# Patient Record
Sex: FEMALE | Race: BLACK OR AFRICAN AMERICAN | NOT HISPANIC OR LATINO | Employment: FULL TIME | URBAN - METROPOLITAN AREA
[De-identification: names, ages, dates, MRNs, and addresses within clinical notes are randomized per-mention and may not be internally consistent; named-entity substitution may affect disease eponyms.]

---

## 2017-08-17 ENCOUNTER — TELEPHONE (OUTPATIENT)
Dept: ORTHOPEDICS | Facility: CLINIC | Age: 52
End: 2017-08-17

## 2017-08-17 NOTE — TELEPHONE ENCOUNTER
----- Message from Marcela Morales sent at 8/17/2017  9:53 AM CDT -----  Contact: self  Needs injections in both knees.  Please call back at

## 2017-08-31 ENCOUNTER — OFFICE VISIT (OUTPATIENT)
Dept: ORTHOPEDICS | Facility: CLINIC | Age: 52
End: 2017-08-31
Payer: OTHER GOVERNMENT

## 2017-08-31 VITALS — BODY MASS INDEX: 31.5 KG/M2 | WEIGHT: 220 LBS | HEIGHT: 70 IN

## 2017-08-31 DIAGNOSIS — M17.11 ARTHRITIS OF RIGHT KNEE: ICD-10-CM

## 2017-08-31 DIAGNOSIS — M17.12 PRIMARY OSTEOARTHRITIS OF LEFT KNEE: Primary | ICD-10-CM

## 2017-08-31 PROCEDURE — 99212 OFFICE O/P EST SF 10 MIN: CPT | Mod: PBBFAC,PN,25 | Performed by: ORTHOPAEDIC SURGERY

## 2017-08-31 PROCEDURE — 99499 UNLISTED E&M SERVICE: CPT | Mod: S$PBB,,, | Performed by: ORTHOPAEDIC SURGERY

## 2017-08-31 PROCEDURE — 20610 DRAIN/INJ JOINT/BURSA W/O US: CPT | Mod: 50,PBBFAC,PN | Performed by: ORTHOPAEDIC SURGERY

## 2017-08-31 PROCEDURE — 99999 PR PBB SHADOW E&M-EST. PATIENT-LVL II: CPT | Mod: PBBFAC,,, | Performed by: ORTHOPAEDIC SURGERY

## 2017-08-31 RX ADMIN — Medication 16 MG: at 10:08

## 2017-08-31 NOTE — PROCEDURES
Large Joint Aspiration/Injection  Date/Time: 8/31/2017 10:24 AM  Performed by: PATO ROD  Authorized by: PATO ROD     Consent Done?:  Yes (Verbal)  Indications:  Pain  Procedure site marked: Yes    Timeout: Prior to procedure the correct patient, procedure, and site was verified      Location:  Knee  Site:  R knee and L knee  Prep: Patient was prepped and draped in usual sterile fashion    Needle size:  20 G  Approach:  Anterolateral  Medications:  16 mg hyaluronate 16 mg/2 mL; 16 mg hyaluronate 16 mg/2 mL  Patient tolerance:  Patient tolerated the procedure well with no immediate complications

## 2017-08-31 NOTE — PROGRESS NOTES
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      aspirin-acetaminophen-caffeine 250-250-65 mg (EXCEDRIN MIGRAINE) 250-250-65 mg per tablet Take 1 tablet by mouth every 6 (six) hours as needed.     etodolac (LODINE) 200 MG Cap Take 1 capsule (200 mg total) by mouth every 6 (six) hours as needed.    isoniazid (NYDRAZID) 300 MG Tab Take 300 mg by mouth once daily.      loratadine-pseudoephedrine 5-120 mg (CLARITIN-D 12-HOUR) 5-120 mg per tablet Take 1 tablet by mouth continuous prn.      diclofenac sodium 1 % Gel Apply 2 g topically 4 (four) times daily.     No current facility-administered medications for this visit.        Allergies   Allergen Reactions    Other      NARCOTICS MAKE ITCH, RESP DEPRESSION, EMOTIONAL       History reviewed. No pertinent family history.    Social History     Social History    Marital status:      Spouse name: N/A    Number of children: N/A    Years of education: N/A     Occupational History    Not on file.     Social History Main Topics    Smoking status: Never Smoker    Smokeless tobacco: Never Used    Alcohol use Yes      Comment: VERY RARELY    Drug use: No    Sexual activity: Not on file     Other Topics Concern    Not on file     Social History Narrative    No narrative on file       Chief Complaint:   Chief Complaint   Patient presents with    Shoulder Pain     bilateral synvisc 1/3       History: This is a 52-year-old female who is a former patient for  left shoulder problems.  Patient underwent a left shoulder arthroplasty with acromial decompression and distal clavicle excision.  Patient is complaining of similar pain on the right shoulder now.  She also complains of bilateral knee pain.  The left knee is more painful than the right, but the right knee swelled more.  Left knee wakes her up at night.  Pain when walking standing or sitting for prolonged periods of time.  Pain is around both kneecaps.  Pain in the shoulders with reaching over her head or behind her back.  Rates her pain as a 6 out of 10.  No treatment for either the knees or shoulder.    Present: Here today for Synvisc.  Pain of 7 out of 10.  cortisone injection seemed to help      Review of Systems:    Musculoskeletal:  See HPI      Physical Examination:    Vital Signs:    There were no vitals filed for this visit.    Body mass index is 31.57 kg/m².    This a well-developed, well nourished patient in no acute distress.  They are alert and oriented and cooperative to examination.  Pt. walks without an antalgic gait.      Examination of bilateral knees shows no rashes or erythema. There are no masses ecchymosis or effusion. Patient has full range of motion from 0-130°. Patient is nontender to palpation over lateral joint line and mildly tender to palpation over the medial joint line. Knee is stable to varus and valgus stress. 5 out of 5 motor strength. Palpable distal pulses. Intact light touch sensation.  Mild Patellofemoral crepitus    X-rays: X-ray of both knees are  reviewed which show mild to moderate degenerative changes.  Moderate medial narrowing.     Assessment:: Mild to moderate knee arthritis      Plan:  I injected both knees with 1 of 3.  Follow-up next week

## 2017-09-06 ENCOUNTER — OFFICE VISIT (OUTPATIENT)
Dept: ORTHOPEDICS | Facility: CLINIC | Age: 52
End: 2017-09-06
Payer: OTHER GOVERNMENT

## 2017-09-06 DIAGNOSIS — M17.11 ARTHRITIS OF RIGHT KNEE: ICD-10-CM

## 2017-09-06 DIAGNOSIS — M17.12 PRIMARY OSTEOARTHRITIS OF LEFT KNEE: Primary | ICD-10-CM

## 2017-09-06 PROCEDURE — 99999 PR PBB SHADOW E&M-EST. PATIENT-LVL II: CPT | Mod: PBBFAC,,, | Performed by: ORTHOPAEDIC SURGERY

## 2017-09-06 PROCEDURE — 99212 OFFICE O/P EST SF 10 MIN: CPT | Mod: PBBFAC,PO,25 | Performed by: ORTHOPAEDIC SURGERY

## 2017-09-06 PROCEDURE — 20610 DRAIN/INJ JOINT/BURSA W/O US: CPT | Mod: 50,PBBFAC,PO | Performed by: ORTHOPAEDIC SURGERY

## 2017-09-06 PROCEDURE — 99499 UNLISTED E&M SERVICE: CPT | Mod: S$PBB,,, | Performed by: ORTHOPAEDIC SURGERY

## 2017-09-06 RX ADMIN — Medication 16 MG: at 09:09

## 2017-09-06 NOTE — PROCEDURES
Large Joint Aspiration/Injection  Date/Time: 9/6/2017 9:29 AM  Performed by: PATO ROD  Authorized by: PATO ROD     Consent Done?:  Yes (Verbal)  Indications:  Pain  Procedure site marked: Yes    Timeout: Prior to procedure the correct patient, procedure, and site was verified      Location:  Knee  Site:  R knee and L knee  Prep: Patient was prepped and draped in usual sterile fashion    Needle size:  20 G  Approach:  Anterolateral  Medications:  16 mg hyaluronate 16 mg/2 mL; 16 mg hyaluronate 16 mg/2 mL  Patient tolerance:  Patient tolerated the procedure well with no immediate complications

## 2017-09-06 NOTE — PROGRESS NOTES
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      aspirin-acetaminophen-caffeine 250-250-65 mg (EXCEDRIN MIGRAINE) 250-250-65 mg per tablet Take 1 tablet by mouth every 6 (six) hours as needed.     etodolac (LODINE) 200 MG Cap Take 1 capsule (200 mg total) by mouth every 6 (six) hours as needed.    isoniazid (NYDRAZID) 300 MG Tab Take 300 mg by mouth once daily.      loratadine-pseudoephedrine 5-120 mg (CLARITIN-D 12-HOUR) 5-120 mg per tablet Take 1 tablet by mouth continuous prn.      diclofenac sodium 1 % Gel Apply 2 g topically 4 (four) times daily.     No current facility-administered medications for this visit.        Allergies   Allergen Reactions    Other      NARCOTICS MAKE ITCH, RESP DEPRESSION, EMOTIONAL       History reviewed. No pertinent family history.    Social History     Social History    Marital status:      Spouse name: N/A    Number of children: N/A    Years of education: N/A     Occupational History    Not on file.     Social History Main Topics    Smoking status: Never Smoker    Smokeless tobacco: Never Used    Alcohol use Yes      Comment: VERY RARELY    Drug use: No    Sexual activity: Not on file     Other Topics Concern    Not on file     Social History Narrative    No narrative on file       Chief Complaint:   Chief Complaint   Patient presents with    Knee Pain     bilateral Synvisc 2/3        History: This is a 52-year-old female who is a former patient for left  shoulder problems.  Patient underwent a left shoulder arthroplasty with acromial decompression and distal clavicle excision.  Patient is complaining of similar pain on the right shoulder now.  She also complains of bilateral knee pain.  The left knee is more painful than the right, but the right knee swelled more.  Left knee wakes her up at night.  Pain when walking standing or sitting for prolonged periods of time.  Pain is around both kneecaps.  Pain in the shoulders with reaching over her head or behind her back.  Rates her pain as a 6 out of 10.  No treatment for either the knees or shoulder.    Present: Here today for Synvisc.  Pain of 7 out of 10.  cortisone injection seemed to help      Review of Systems:    Musculoskeletal:  See HPI      Physical Examination:    Vital Signs:    There were no vitals filed for this visit.    There is no height or weight on file to calculate BMI.    This a well-developed, well nourished patient in no acute distress.  They are alert and oriented and cooperative to examination.  Pt. walks without an antalgic gait.      Examination of bilateral knees shows no rashes or erythema. There are no masses ecchymosis or effusion. Patient has full range of motion from 0-130°. Patient is nontender to palpation over lateral joint line and mildly tender to palpation over the medial joint line. Knee is stable to varus and valgus stress. 5 out of 5 motor strength. Palpable distal pulses. Intact light touch sensation.  Mild Patellofemoral crepitus    X-rays: X-ray of both knees are  reviewed which show mild to moderate degenerative changes.  Moderate medial narrowing.     Assessment:: Mild to moderate knee arthritis      Plan:  I injected both knees with Synvisc 2 of 3.  Follow-up next week

## 2017-09-12 ENCOUNTER — TELEPHONE (OUTPATIENT)
Dept: ORTHOPEDICS | Facility: CLINIC | Age: 52
End: 2017-09-12

## 2017-09-12 NOTE — TELEPHONE ENCOUNTER
----- Message from Prabha Epperson sent at 9/12/2017 12:16 PM CDT -----  Contact: self [phone]  Please call her to schedule an injection appt.  Thank you!

## 2017-09-14 ENCOUNTER — OFFICE VISIT (OUTPATIENT)
Dept: ORTHOPEDICS | Facility: CLINIC | Age: 52
End: 2017-09-14
Payer: OTHER GOVERNMENT

## 2017-09-14 DIAGNOSIS — M17.12 PRIMARY OSTEOARTHRITIS OF LEFT KNEE: Primary | ICD-10-CM

## 2017-09-14 DIAGNOSIS — M17.11 ARTHRITIS OF RIGHT KNEE: ICD-10-CM

## 2017-09-14 PROCEDURE — 99212 OFFICE O/P EST SF 10 MIN: CPT | Mod: PBBFAC,PN,25 | Performed by: ORTHOPAEDIC SURGERY

## 2017-09-14 PROCEDURE — 99999 PR PBB SHADOW E&M-EST. PATIENT-LVL II: CPT | Mod: PBBFAC,,, | Performed by: ORTHOPAEDIC SURGERY

## 2017-09-14 PROCEDURE — 20610 DRAIN/INJ JOINT/BURSA W/O US: CPT | Mod: 50,PBBFAC,PN | Performed by: ORTHOPAEDIC SURGERY

## 2017-09-14 PROCEDURE — 99499 UNLISTED E&M SERVICE: CPT | Mod: S$PBB,,, | Performed by: ORTHOPAEDIC SURGERY

## 2017-09-14 RX ADMIN — Medication 16 MG: at 08:09

## 2017-09-14 NOTE — PROGRESS NOTES
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      aspirin-acetaminophen-caffeine 250-250-65 mg (EXCEDRIN MIGRAINE) 250-250-65 mg per tablet Take 1 tablet by mouth every 6 (six) hours as needed.     etodolac (LODINE) 200 MG Cap Take 1 capsule (200 mg total) by mouth every 6 (six) hours as needed.    isoniazid (NYDRAZID) 300 MG Tab Take 300 mg by mouth once daily.      loratadine-pseudoephedrine 5-120 mg (CLARITIN-D 12-HOUR) 5-120 mg per tablet Take 1 tablet by mouth continuous prn.      diclofenac sodium 1 % Gel Apply 2 g topically 4 (four) times daily.     No current facility-administered medications for this visit.        Allergies   Allergen Reactions    Other      NARCOTICS MAKE ITCH, RESP DEPRESSION, EMOTIONAL       History reviewed. No pertinent family history.    Social History     Social History    Marital status:      Spouse name: N/A    Number of children: N/A    Years of education: N/A     Occupational History    Not on file.     Social History Main Topics    Smoking status: Never Smoker    Smokeless tobacco: Never Used    Alcohol use Yes      Comment: VERY RARELY    Drug use: No    Sexual activity: Not on file     Other Topics Concern    Not on file     Social History Narrative    No narrative on file       Chief Complaint:   Chief Complaint   Patient presents with    Knee Pain     B synvisc 3/3       History: This is a 52-year-old female who is a former patient for left shoulder  problems.  Patient underwent a left shoulder arthroplasty with acromial decompression and distal clavicle excision.  Patient is complaining of similar pain on the right shoulder now.  She also complains of bilateral knee pain.  The left knee is more painful than the right, but the right knee swelled more.  Left knee wakes her up at night.  Pain when walking standing or sitting for prolonged periods of time.  Pain is around both kneecaps.  Pain in the shoulders with reaching over her head or behind her back.  Rates her pain as a 6 out of 10.  No treatment for either the knees or shoulder.    Present: Here today for Synvisc.  Pain of 7 out of 10.  cortisone injection seemed to help      Review of Systems:    Musculoskeletal:  See HPI      Physical Examination:    Vital Signs:    There were no vitals filed for this visit.    There is no height or weight on file to calculate BMI.    This a well-developed, well nourished patient in no acute distress.  They are alert and oriented and cooperative to examination.  Pt. walks without an antalgic gait.      Examination of bilateral knees shows no rashes or erythema. There are no masses ecchymosis or effusion. Patient has full range of motion from 0-130°. Patient is nontender to palpation over lateral joint line and mildly tender to palpation over the medial joint line. Knee is stable to varus and valgus stress. 5 out of 5 motor strength. Palpable distal pulses. Intact light touch sensation.  Mild Patellofemoral crepitus    X-rays: X-ray of both knees are  reviewed which show mild to moderate degenerative changes.  Moderate medial narrowing.     Assessment:: Mild to moderate knee arthritis      Plan:  I injected both knees with Synvisc 3 of 3.  Follow-up in about 6 months

## 2017-09-14 NOTE — PROCEDURES
Large Joint Aspiration/Injection  Date/Time: 9/14/2017 8:20 AM  Performed by: PATO ROD  Authorized by: PATO ROD     Consent Done?:  Yes (Verbal)  Indications:  Pain  Procedure site marked: Yes    Timeout: Prior to procedure the correct patient, procedure, and site was verified      Location:  Knee  Site:  R knee and L knee  Prep: Patient was prepped and draped in usual sterile fashion    Needle size:  20 G  Approach:  Anterolateral  Medications:  16 mg hyaluronate 16 mg/2 mL; 16 mg hyaluronate 16 mg/2 mL  Patient tolerance:  Patient tolerated the procedure well with no immediate complications

## 2019-12-13 ENCOUNTER — HOSPITAL ENCOUNTER (EMERGENCY)
Facility: HOSPITAL | Age: 54
Discharge: HOME | End: 2019-12-13
Attending: EMERGENCY MEDICINE
Payer: OTHER GOVERNMENT

## 2019-12-13 VITALS
BODY MASS INDEX: 34.53 KG/M2 | DIASTOLIC BLOOD PRESSURE: 90 MMHG | HEART RATE: 96 BPM | SYSTOLIC BLOOD PRESSURE: 140 MMHG | WEIGHT: 220 LBS | TEMPERATURE: 99 F | RESPIRATION RATE: 16 BRPM | HEIGHT: 67 IN | OXYGEN SATURATION: 98 %

## 2019-12-13 DIAGNOSIS — R10.13 EPIGASTRIC PAIN: ICD-10-CM

## 2019-12-13 DIAGNOSIS — K92.2 GASTROINTESTINAL HEMORRHAGE, UNSPECIFIED GASTROINTESTINAL HEMORRHAGE TYPE: ICD-10-CM

## 2019-12-13 DIAGNOSIS — K59.00 CONSTIPATION, UNSPECIFIED CONSTIPATION TYPE: Primary | ICD-10-CM

## 2019-12-13 LAB
ALBUMIN SERPL BCP-MCNC: 3.6 G/DL (ref 3.5–5.2)
ALP SERPL-CCNC: 92 U/L (ref 55–135)
ALT SERPL W/O P-5'-P-CCNC: 16 U/L (ref 10–44)
ANION GAP SERPL CALC-SCNC: 7 MMOL/L (ref 8–16)
AST SERPL-CCNC: 16 U/L (ref 10–40)
BASOPHILS # BLD AUTO: 0.02 K/UL (ref 0–0.2)
BASOPHILS NFR BLD: 0.2 % (ref 0–1.9)
BILIRUB SERPL-MCNC: 0.4 MG/DL (ref 0.1–1)
BILIRUB UR QL STRIP: NEGATIVE
BUN SERPL-MCNC: 13 MG/DL (ref 6–20)
CALCIUM SERPL-MCNC: 9.1 MG/DL (ref 8.7–10.5)
CHLORIDE SERPL-SCNC: 106 MMOL/L (ref 95–110)
CLARITY UR: CLEAR
CO2 SERPL-SCNC: 26 MMOL/L (ref 23–29)
COLOR UR: YELLOW
CREAT SERPL-MCNC: 1 MG/DL (ref 0.5–1.4)
DIFFERENTIAL METHOD: ABNORMAL
EOSINOPHIL # BLD AUTO: 0.1 K/UL (ref 0–0.5)
EOSINOPHIL NFR BLD: 1 % (ref 0–8)
ERYTHROCYTE [DISTWIDTH] IN BLOOD BY AUTOMATED COUNT: 14.1 % (ref 11.5–14.5)
EST. GFR  (AFRICAN AMERICAN): >60 ML/MIN/1.73 M^2
EST. GFR  (NON AFRICAN AMERICAN): >60 ML/MIN/1.73 M^2
GLUCOSE SERPL-MCNC: 118 MG/DL (ref 70–110)
GLUCOSE UR QL STRIP: NEGATIVE
HCT VFR BLD AUTO: 40 % (ref 37–48.5)
HGB BLD-MCNC: 12.9 G/DL (ref 12–16)
HGB UR QL STRIP: ABNORMAL
IMM GRANULOCYTES # BLD AUTO: 0.02 K/UL (ref 0–0.04)
INFLUENZA A, MOLECULAR: NEGATIVE
INFLUENZA B, MOLECULAR: NEGATIVE
KETONES UR QL STRIP: NEGATIVE
LEUKOCYTE ESTERASE UR QL STRIP: NEGATIVE
LIPASE SERPL-CCNC: 28 U/L (ref 4–60)
LYMPHOCYTES # BLD AUTO: 1.5 K/UL (ref 1–4.8)
LYMPHOCYTES NFR BLD: 18.1 % (ref 18–48)
MCH RBC QN AUTO: 29.2 PG (ref 27–31)
MCHC RBC AUTO-ENTMCNC: 32.3 G/DL (ref 32–36)
MCV RBC AUTO: 91 FL (ref 82–98)
MONOCYTES # BLD AUTO: 0.5 K/UL (ref 0.3–1)
MONOCYTES NFR BLD: 5.5 % (ref 4–15)
NEUTROPHILS # BLD AUTO: 6.2 K/UL (ref 1.8–7.7)
NEUTROPHILS NFR BLD: 75 % (ref 38–73)
NITRITE UR QL STRIP: NEGATIVE
NRBC BLD-RTO: 0 /100 WBC
PH UR STRIP: 7 [PH] (ref 5–8)
PLATELET # BLD AUTO: 276 K/UL (ref 150–350)
PMV BLD AUTO: 9.4 FL (ref 9.2–12.9)
POTASSIUM SERPL-SCNC: 4.6 MMOL/L (ref 3.5–5.1)
PROT SERPL-MCNC: 7.3 G/DL (ref 6–8.4)
PROT UR QL STRIP: NEGATIVE
RBC # BLD AUTO: 4.42 M/UL (ref 4–5.4)
SODIUM SERPL-SCNC: 139 MMOL/L (ref 136–145)
SP GR UR STRIP: 1.01 (ref 1–1.03)
SPECIMEN SOURCE: NORMAL
URN SPEC COLLECT METH UR: ABNORMAL
UROBILINOGEN UR STRIP-ACNC: NEGATIVE EU/DL
WBC # BLD AUTO: 8.25 K/UL (ref 3.9–12.7)

## 2019-12-13 PROCEDURE — 82272 OCCULT BLD FECES 1-3 TESTS: CPT

## 2019-12-13 PROCEDURE — 36415 COLL VENOUS BLD VENIPUNCTURE: CPT

## 2019-12-13 PROCEDURE — 83690 ASSAY OF LIPASE: CPT

## 2019-12-13 PROCEDURE — 80053 COMPREHEN METABOLIC PANEL: CPT

## 2019-12-13 PROCEDURE — 99284 EMERGENCY DEPT VISIT MOD MDM: CPT | Mod: 25

## 2019-12-13 PROCEDURE — 85025 COMPLETE CBC W/AUTO DIFF WBC: CPT

## 2019-12-13 PROCEDURE — 81003 URINALYSIS AUTO W/O SCOPE: CPT

## 2019-12-13 PROCEDURE — 87502 INFLUENZA DNA AMP PROBE: CPT

## 2019-12-13 RX ORDER — PANTOPRAZOLE SODIUM 20 MG/1
40 TABLET, DELAYED RELEASE ORAL DAILY
Qty: 60 TABLET | Refills: 0 | Status: SHIPPED | OUTPATIENT
Start: 2019-12-13 | End: 2023-02-06

## 2019-12-13 RX ORDER — SUCRALFATE 1 G/1
1 TABLET ORAL 4 TIMES DAILY
Qty: 60 TABLET | Refills: 1 | Status: SHIPPED | OUTPATIENT
Start: 2019-12-13 | End: 2023-02-06

## 2019-12-13 RX ORDER — POLYETHYLENE GLYCOL 3350 17 G/17G
17 POWDER, FOR SOLUTION ORAL DAILY
Qty: 14 EACH | Refills: 0 | Status: SHIPPED | OUTPATIENT
Start: 2019-12-13 | End: 2023-02-06

## 2019-12-13 NOTE — ED PROVIDER NOTES
"Encounter Date: 2019    SCRIBE #1 NOTE: I, Rodirgo Torrez, am scribing for, and in the presence of, Dr. Moser.       History     Chief Complaint   Patient presents with    Abdominal Pain     LUQ pain    Diarrhea     Time seen by provider: 9:13 AM on 2019      Sloane Dimas is a 54 y.o. female with a PMHx of anemia and migraine who presents to the ED for abdominal pain that started 4 days ago. The patient reports that she travels for work and this pain started in her LUQ 4 days ago. She states that this abdominal pain is intermittent and sharp in the LUQ. She states that she has a "Fullness" like sensation in her abdomen regardless of diet. Patient reports that with onset of this abdominal pain she was having "little roel" for her stool. She states that 1 day ago she started to have "Ross" dark diarrhea. She states that throughout her illness she has had 4-5 episodes diarrhea a day. Per patient, she typically has 1 bowel movement a day. Patient reports that she has had some nausea and vomiting associated with this abdominal pain. She also admits that she started having some chills and rhinorrhea 1 night ago. Patient denies pain with urination, fever, SOB, chest pain, headache, or any other complaint at this time.The patient has a PSHx of , hysterectomy, and hemorrhoid surgery.    The history is provided by the patient.     Review of patient's allergies indicates:   Allergen Reactions    Other      NARCOTICS MAKE ITCH, RESP DEPRESSION, EMOTIONAL     Past Medical History:   Diagnosis Date    Anemia     Blood transfusion     Migraine     Positive PPD      DX- CXR OK- STARTED INH     Wears glasses     CONTACS     Past Surgical History:   Procedure Laterality Date    BREAST LUMPECTOMY       SECTION, CLASSIC      HEMORRHOID SURGERY      HYSTERECTOMY      2012    KIDNEY SURGERY      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      TUBAL LIGATION       History " reviewed. No pertinent family history.  Social History     Tobacco Use    Smoking status: Never Smoker    Smokeless tobacco: Never Used   Substance Use Topics    Alcohol use: Yes     Comment: VERY RARELY    Drug use: No     Review of Systems   Constitutional: Positive for chills. Negative for fever.   HENT: Positive for rhinorrhea. Negative for sore throat.    Respiratory: Negative for shortness of breath.    Cardiovascular: Negative for chest pain.   Gastrointestinal: Positive for abdominal pain, diarrhea, nausea and vomiting.        -melena   Genitourinary: Negative for dysuria.   Musculoskeletal: Negative for back pain.   Skin: Negative for rash.   Neurological: Negative for headaches.   Hematological: Does not bruise/bleed easily.       Physical Exam     Initial Vitals [12/13/19 0854]   BP Pulse Resp Temp SpO2   (!) 140/90 96 16 98.6 °F (37 °C) 98 %      MAP       --         Physical Exam    Nursing note and vitals reviewed.  Constitutional: She appears well-developed and well-nourished. She is cooperative.  Non-toxic appearance. She does not have a sickly appearance.   HENT:   Head: Normocephalic and atraumatic.   Right Ear: External ear normal.   Left Ear: External ear normal.   Nose: Nose normal.   Mouth/Throat: Uvula is midline and oropharynx is clear and moist.   Eyes: Conjunctivae and lids are normal. Pupils are equal, round, and reactive to light.   Neck: Normal range of motion and full passive range of motion without pain. Neck supple.   Cardiovascular: Normal rate, regular rhythm and normal heart sounds. Exam reveals no gallop and no friction rub.    No murmur heard.  Pulmonary/Chest: Breath sounds normal. She has no wheezes. She has no rhonchi. She has no rales.   Abdominal: Soft. Normal appearance. There is no tenderness. There is no rigidity, no rebound and no guarding.   Genitourinary: Rectal exam shows guaiac positive stool. Guaiac positive stool. : Acceptable.  Genitourinary  Comments: FOTB stool positive. Chaperone present. Tannish color   Neurological: She is alert and oriented to person, place, and time. GCS eye subscore is 4. GCS verbal subscore is 5. GCS motor subscore is 6.   Skin: Skin is warm, dry and intact. No rash noted.         ED Course   Procedures  Labs Reviewed   CBC W/ AUTO DIFFERENTIAL - Abnormal; Notable for the following components:       Result Value    Gran% 75.0 (*)     All other components within normal limits   COMPREHENSIVE METABOLIC PANEL - Abnormal; Notable for the following components:    Glucose 118 (*)     Anion Gap 7 (*)     All other components within normal limits   URINALYSIS, REFLEX TO URINE CULTURE - Abnormal; Notable for the following components:    Occult Blood UA Trace (*)     All other components within normal limits    Narrative:     Preferred Collection Type->Urine, Clean Catch   INFLUENZA A & B BY MOLECULAR   LIPASE          Imaging Results          X-Ray Abdomen Flat And Erect (Final result)  Result time 12/13/19 10:28:08    Final result by Johanny Blanchard MD (12/13/19 10:28:08)                 Impression:      No acute findings      Electronically signed by: Johanny Blanchard MD  Date:    12/13/2019  Time:    10:28             Narrative:    EXAMINATION:  XR ABDOMEN FLAT AND ERECT    CLINICAL HISTORY:  Abdominal Pain;    TECHNIQUE:  Flat and erect AP views of the abdomen were performed.    COMPARISON:  None    FINDINGS:  No free intraperitoneal air seen beneath the diaphragms.  There are surgical clips right upper quadrant of the abdomen.  There is gas and stool in nondistended colon.                                 Medical Decision Making:   History:   Old Medical Records: I decided to obtain old medical records.  Initial Assessment:   54-year-old woman who presents emergency department complaining left upper/epigastric abdominal pain with multiple bowel movements per day described as small round roel of stool, some which have appeared  dark for approximately 1 week.  Patient is afebrile well-appearing and does not appear to be in a significant amount of pain. She does not exhibit any significant abdominal tenderness with most tenderness being found in the epigastric region.  I have low suspicion for perforation, acute diverticulitis, peritonitis, acute cholecystitis or pancreatitis.  Laboratory evaluation was performed and was unremarkable. X-ray show moderate amount of stool without obstructive process in the abdomen.  I suspect she is having constipation given the quality of her stools and x-ray.  Digital rectal examination revealed light tan stool with occult blood.  Given her epigastric tenderness she is likely having gastritis with versus peptic ulcer disease.  She will be started on Carafate, Protonix and MiraLax.  Dietary changes were discussed.  PCP follow-up.  Return precautions were discussed for worsening pain, fever or bleeding.  Patient discharged improved in no acute distress.  Clinical Tests:   Lab Tests: Reviewed and Ordered  Radiological Study: Ordered and Reviewed            Scribe Attestation:   Scribe #1: I performed the above scribed service and the documentation accurately describes the services I performed. I attest to the accuracy of the note.     I, Ehsan Varma, personally performed the services described in this documentation. All medical record entries made by the scribe were at my direction and in my presence.  I have reviewed the chart and agree that the record reflects my personal performance and is accurate and complete. Scar Moser MD.                      Clinical Impression:       ICD-10-CM ICD-9-CM   1. Constipation, unspecified constipation type K59.00 564.00   2. Epigastric pain R10.13 789.06   3. Gastrointestinal hemorrhage, unspecified gastrointestinal hemorrhage type K92.2 578.9         Disposition:   Disposition: Discharged  Condition: Stable                     Scar Moser MD  12/13/19  [id number]

## 2020-01-14 ENCOUNTER — LAB VISIT (OUTPATIENT)
Dept: LAB | Facility: HOSPITAL | Age: 55
End: 2020-01-14
Attending: INTERNAL MEDICINE
Payer: OTHER GOVERNMENT

## 2020-01-14 ENCOUNTER — OFFICE VISIT (OUTPATIENT)
Dept: FAMILY MEDICINE | Facility: CLINIC | Age: 55
End: 2020-01-14
Payer: OTHER GOVERNMENT

## 2020-01-14 VITALS
HEIGHT: 67 IN | TEMPERATURE: 98 F | OXYGEN SATURATION: 99 % | WEIGHT: 230.19 LBS | DIASTOLIC BLOOD PRESSURE: 80 MMHG | SYSTOLIC BLOOD PRESSURE: 128 MMHG | HEART RATE: 80 BPM | BODY MASS INDEX: 36.13 KG/M2

## 2020-01-14 DIAGNOSIS — R73.9 HYPERGLYCEMIA: ICD-10-CM

## 2020-01-14 DIAGNOSIS — K27.9 PUD (PEPTIC ULCER DISEASE): ICD-10-CM

## 2020-01-14 DIAGNOSIS — Z11.59 ENCOUNTER FOR HEPATITIS C SCREENING TEST FOR LOW RISK PATIENT: ICD-10-CM

## 2020-01-14 DIAGNOSIS — H35.9 RETINA DISORDER: ICD-10-CM

## 2020-01-14 DIAGNOSIS — Z11.1 TUBERCULOSIS SCREENING: ICD-10-CM

## 2020-01-14 DIAGNOSIS — E66.9 OBESITY, UNSPECIFIED CLASSIFICATION, UNSPECIFIED OBESITY TYPE, UNSPECIFIED WHETHER SERIOUS COMORBIDITY PRESENT: ICD-10-CM

## 2020-01-14 DIAGNOSIS — Z12.4 SCREENING FOR CERVICAL CANCER: Primary | ICD-10-CM

## 2020-01-14 DIAGNOSIS — Z00.00 ANNUAL PHYSICAL EXAM: ICD-10-CM

## 2020-01-14 LAB
ALBUMIN SERPL BCP-MCNC: 3.7 G/DL (ref 3.5–5.2)
ALP SERPL-CCNC: 93 U/L (ref 55–135)
ALT SERPL W/O P-5'-P-CCNC: 18 U/L (ref 10–44)
ANION GAP SERPL CALC-SCNC: 7 MMOL/L (ref 8–16)
AST SERPL-CCNC: 18 U/L (ref 10–40)
BASOPHILS # BLD AUTO: 0.04 K/UL (ref 0–0.2)
BASOPHILS NFR BLD: 0.8 % (ref 0–1.9)
BILIRUB SERPL-MCNC: 0.3 MG/DL (ref 0.1–1)
BUN SERPL-MCNC: 10 MG/DL (ref 6–20)
CALCIUM SERPL-MCNC: 9 MG/DL (ref 8.7–10.5)
CHLORIDE SERPL-SCNC: 105 MMOL/L (ref 95–110)
CHOLEST SERPL-MCNC: 162 MG/DL (ref 120–199)
CHOLEST/HDLC SERPL: 3.6 {RATIO} (ref 2–5)
CO2 SERPL-SCNC: 26 MMOL/L (ref 23–29)
CREAT SERPL-MCNC: 1 MG/DL (ref 0.5–1.4)
DIFFERENTIAL METHOD: ABNORMAL
EOSINOPHIL # BLD AUTO: 0.1 K/UL (ref 0–0.5)
EOSINOPHIL NFR BLD: 1.1 % (ref 0–8)
ERYTHROCYTE [DISTWIDTH] IN BLOOD BY AUTOMATED COUNT: 14.2 % (ref 11.5–14.5)
EST. GFR  (AFRICAN AMERICAN): >60 ML/MIN/1.73 M^2
EST. GFR  (NON AFRICAN AMERICAN): >60 ML/MIN/1.73 M^2
ESTIMATED AVG GLUCOSE: 131 MG/DL (ref 68–131)
GLUCOSE SERPL-MCNC: 112 MG/DL (ref 70–110)
HBA1C MFR BLD HPLC: 6.2 % (ref 4–5.6)
HCT VFR BLD AUTO: 42.4 % (ref 37–48.5)
HDLC SERPL-MCNC: 45 MG/DL (ref 40–75)
HDLC SERPL: 27.8 % (ref 20–50)
HGB BLD-MCNC: 12.7 G/DL (ref 12–16)
IMM GRANULOCYTES # BLD AUTO: 0.01 K/UL (ref 0–0.04)
IMM GRANULOCYTES NFR BLD AUTO: 0.2 % (ref 0–0.5)
LDLC SERPL CALC-MCNC: 97.6 MG/DL (ref 63–159)
LYMPHOCYTES # BLD AUTO: 2.7 K/UL (ref 1–4.8)
LYMPHOCYTES NFR BLD: 51 % (ref 18–48)
MCH RBC QN AUTO: 28.2 PG (ref 27–31)
MCHC RBC AUTO-ENTMCNC: 30 G/DL (ref 32–36)
MCV RBC AUTO: 94 FL (ref 82–98)
MONOCYTES # BLD AUTO: 0.3 K/UL (ref 0.3–1)
MONOCYTES NFR BLD: 5.3 % (ref 4–15)
NEUTROPHILS # BLD AUTO: 2.2 K/UL (ref 1.8–7.7)
NEUTROPHILS NFR BLD: 41.6 % (ref 38–73)
NONHDLC SERPL-MCNC: 117 MG/DL
NRBC BLD-RTO: 0 /100 WBC
PLATELET # BLD AUTO: 309 K/UL (ref 150–350)
PMV BLD AUTO: 10.9 FL (ref 9.2–12.9)
POTASSIUM SERPL-SCNC: 4.1 MMOL/L (ref 3.5–5.1)
PROT SERPL-MCNC: 7.5 G/DL (ref 6–8.4)
RBC # BLD AUTO: 4.5 M/UL (ref 4–5.4)
SODIUM SERPL-SCNC: 138 MMOL/L (ref 136–145)
TRIGL SERPL-MCNC: 97 MG/DL (ref 30–150)
TSH SERPL DL<=0.005 MIU/L-ACNC: 0.95 UIU/ML (ref 0.4–4)
WBC # BLD AUTO: 5.33 K/UL (ref 3.9–12.7)

## 2020-01-14 PROCEDURE — 99214 OFFICE O/P EST MOD 30 MIN: CPT | Mod: PBBFAC,PO | Performed by: INTERNAL MEDICINE

## 2020-01-14 PROCEDURE — 84443 ASSAY THYROID STIM HORMONE: CPT

## 2020-01-14 PROCEDURE — 80053 COMPREHEN METABOLIC PANEL: CPT

## 2020-01-14 PROCEDURE — 99999 PR PBB SHADOW E&M-EST. PATIENT-LVL IV: CPT | Mod: PBBFAC,,, | Performed by: INTERNAL MEDICINE

## 2020-01-14 PROCEDURE — 99386 PREV VISIT NEW AGE 40-64: CPT | Mod: S$PBB,,, | Performed by: INTERNAL MEDICINE

## 2020-01-14 PROCEDURE — 86803 HEPATITIS C AB TEST: CPT

## 2020-01-14 PROCEDURE — 83036 HEMOGLOBIN GLYCOSYLATED A1C: CPT

## 2020-01-14 PROCEDURE — 99386 PR PREVENTIVE VISIT,NEW,40-64: ICD-10-PCS | Mod: S$PBB,,, | Performed by: INTERNAL MEDICINE

## 2020-01-14 PROCEDURE — 99999 PR PBB SHADOW E&M-EST. PATIENT-LVL IV: ICD-10-PCS | Mod: PBBFAC,,, | Performed by: INTERNAL MEDICINE

## 2020-01-14 PROCEDURE — 85025 COMPLETE CBC W/AUTO DIFF WBC: CPT

## 2020-01-14 PROCEDURE — 80061 LIPID PANEL: CPT

## 2020-01-14 PROCEDURE — 36415 COLL VENOUS BLD VENIPUNCTURE: CPT | Mod: PO

## 2020-01-14 NOTE — PROGRESS NOTES
Subjective:       Patient ID: Sloane Dimas is a 54 y.o. female.    Chief Complaint: Establish Care    Pt is a new pt here to get established. She has PUD and is on carafate and protonix and is doing well. She is due for labs. She is due for obgyn and mammogram.  She is nto a smoker. She is exercising. She is utd colonscopy elodia fink 4 years ago.     Review of Systems   Constitutional: Negative for activity change and unexpected weight change.   HENT: Negative for hearing loss, rhinorrhea and trouble swallowing.    Eyes: Negative for discharge and visual disturbance.   Respiratory: Negative for chest tightness and wheezing.    Cardiovascular: Negative for chest pain and palpitations.   Gastrointestinal: Positive for constipation, diarrhea and vomiting. Negative for blood in stool.   Endocrine: Negative for polydipsia and polyuria.   Genitourinary: Negative for difficulty urinating, dysuria, hematuria and menstrual problem.   Musculoskeletal: Positive for arthralgias and joint swelling. Negative for neck pain.   Neurological: Positive for headaches. Negative for weakness.   Psychiatric/Behavioral: Negative for confusion and dysphoric mood.       Objective:      Physical Exam   Constitutional: She is oriented to person, place, and time. She appears well-developed and well-nourished.   HENT:   Head: Normocephalic and atraumatic.   Mouth/Throat: Oropharynx is clear and moist.   Eyes: Pupils are equal, round, and reactive to light. Conjunctivae and EOM are normal.   Neck: Normal range of motion. Neck supple. No thyromegaly present.   Cardiovascular: Normal rate, regular rhythm, normal heart sounds and intact distal pulses. Exam reveals no gallop and no friction rub.   No murmur heard.  Pulmonary/Chest: Effort normal and breath sounds normal. No respiratory distress. She has no wheezes. She has no rales.   Abdominal: Soft. Bowel sounds are normal. She exhibits no distension. There is no tenderness.    Musculoskeletal: Normal range of motion. She exhibits no edema.   Lymphadenopathy:     She has no cervical adenopathy.   Neurological: She is alert and oriented to person, place, and time. No cranial nerve deficit.   Skin: Skin is warm and dry.   Psychiatric: She has a normal mood and affect. Her behavior is normal.       Assessment:       1. Screening for cervical cancer    2. Retina disorder    3. PUD (peptic ulcer disease)    4. Annual physical exam    5. Obesity, unspecified classification, unspecified obesity type, unspecified whether serious comorbidity present    6. Tuberculosis screening    7. Hyperglycemia    8. Encounter for hepatitis C screening test for low risk patient        Plan:       Annual- check labs. Flu shot today. Ordered referral to obgyn for mmg. utd colonsconpy

## 2020-01-15 LAB — HCV AB SERPL QL IA: NEGATIVE

## 2020-01-17 DIAGNOSIS — Z12.11 COLON CANCER SCREENING: ICD-10-CM

## 2020-01-17 DIAGNOSIS — Z12.39 BREAST CANCER SCREENING: ICD-10-CM

## 2020-04-23 ENCOUNTER — PATIENT MESSAGE (OUTPATIENT)
Dept: FAMILY MEDICINE | Facility: CLINIC | Age: 55
End: 2020-04-23

## 2020-04-23 DIAGNOSIS — E13.319: Primary | ICD-10-CM

## 2020-04-23 DIAGNOSIS — Z12.4 CERVICAL CANCER SCREENING: ICD-10-CM

## 2021-01-04 ENCOUNTER — PATIENT MESSAGE (OUTPATIENT)
Dept: ADMINISTRATIVE | Facility: HOSPITAL | Age: 56
End: 2021-01-04

## 2021-01-20 DIAGNOSIS — Z12.11 COLON CANCER SCREENING: ICD-10-CM

## 2021-03-24 DIAGNOSIS — Z12.31 OTHER SCREENING MAMMOGRAM: ICD-10-CM

## 2021-04-06 ENCOUNTER — PATIENT MESSAGE (OUTPATIENT)
Dept: ADMINISTRATIVE | Facility: HOSPITAL | Age: 56
End: 2021-04-06

## 2021-04-29 ENCOUNTER — PATIENT MESSAGE (OUTPATIENT)
Dept: RESEARCH | Facility: HOSPITAL | Age: 56
End: 2021-04-29

## 2021-07-07 ENCOUNTER — PATIENT MESSAGE (OUTPATIENT)
Dept: ADMINISTRATIVE | Facility: HOSPITAL | Age: 56
End: 2021-07-07

## 2022-01-05 ENCOUNTER — HOSPITAL ENCOUNTER (EMERGENCY)
Facility: HOSPITAL | Age: 57
Discharge: HOME | End: 2022-01-05
Attending: EMERGENCY MEDICINE
Payer: OTHER GOVERNMENT

## 2022-01-05 VITALS
HEART RATE: 70 BPM | WEIGHT: 215.38 LBS | RESPIRATION RATE: 16 BRPM | OXYGEN SATURATION: 100 % | SYSTOLIC BLOOD PRESSURE: 160 MMHG | TEMPERATURE: 98 F | DIASTOLIC BLOOD PRESSURE: 78 MMHG | HEIGHT: 67 IN | BODY MASS INDEX: 33.8 KG/M2

## 2022-01-05 DIAGNOSIS — S92.515A CLOSED NONDISPLACED FRACTURE OF PROXIMAL PHALANX OF LESSER TOE OF LEFT FOOT, INITIAL ENCOUNTER: Primary | ICD-10-CM

## 2022-01-05 PROCEDURE — 99283 EMERGENCY DEPT VISIT LOW MDM: CPT

## 2022-01-05 NOTE — ED PROVIDER NOTES
"Encounter Date: 2022    SCRIBE #1 NOTE: ITrish, aldo scribing for, and in the presence of, Amber Decker NP.       History     Chief Complaint   Patient presents with    Toe Injury     Stubbed 4th toe right foot last night      Time seen by provider: 10:54 AM on 2022    Sloane Dimas is a 56 y.o. female who presents to the ED with an onset of a toe injury. Patient reports she had "stubbed" her right 4th toe against a piece of furniture last night. Pain becomes significant upon walking. She has been on Tylenol and Motrin for pain. The patient denies any other symptoms at this time. No pertinent PMHx or PSHx.       The history is provided by the patient.     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  Social History     Tobacco Use    Smoking status: Never Smoker    Smokeless tobacco: Never Used   Substance Use Topics    Alcohol use: Yes     Comment: VERY RARELY    Drug use: No     Review of Systems   Constitutional: Negative for chills and fever.   HENT: Negative for nosebleeds.    Eyes: Negative for visual disturbance.   Respiratory: Negative for cough and shortness of breath.    Cardiovascular: Negative for chest pain and palpitations.   Gastrointestinal: Negative for abdominal pain, diarrhea, nausea and vomiting.   Genitourinary: Negative for dysuria and hematuria.   Musculoskeletal: Positive for arthralgias and gait problem. Negative for back pain " and neck pain.   Skin: Negative for rash.   Neurological: Negative for seizures, syncope and headaches.       Physical Exam     Initial Vitals [01/05/22 0821]   BP Pulse Resp Temp SpO2   (!) 164/91 74 18 98.1 °F (36.7 °C) 99 %      MAP       --         Physical Exam    Nursing note and vitals reviewed.  Constitutional: Vital signs are normal. She appears well-developed and well-nourished.   HENT:   Head: Normocephalic and atraumatic.   Eyes: Pupils are equal, round, and reactive to light.   Neck: Neck supple.   Cardiovascular: Normal rate, regular rhythm, normal heart sounds and intact distal pulses. Exam reveals no gallop and no friction rub.    No murmur heard.  Pulmonary/Chest: Breath sounds normal. She has no wheezes. She has no rhonchi. She has no rales.   Abdominal: Normal appearance.   Musculoskeletal:      Cervical back: Neck supple.      Comments: Tenderness without swelling or deformity over the proximal 4th toe of the right foot. 2+ DP pulse.      Neurological: She is alert and oriented to person, place, and time. She has normal strength.   Skin: Skin is warm, dry and intact.   Psychiatric: She has a normal mood and affect. Her speech is normal and behavior is normal.         ED Course   Procedures  Labs Reviewed - No data to display       Imaging Results          X-Ray Toe 2 or More Views Right (Final result)  Result time 01/05/22 10:28:19    Final result by Bartolo Oliver Jr., MD (01/05/22 10:28:19)                 Impression:      Acute slightly angulated fracture of the proximal phalanx of the right 4th toe.      Electronically signed by: Bartolo Oliver MD  Date:    01/05/2022  Time:    10:28             Narrative:    EXAMINATION:  XR TOE 2 OR MORE VIEWS RIGHT    CLINICAL HISTORY:  4th toe injury;    TECHNIQUE:  Three views of the right toes were performed    COMPARISON:  None    FINDINGS:  There is a mildly angulated acute fracture of the base of the proximal phalanx of the right 4th toe. Other  fractures are not seen. Foreign bodies are not demonstrated                                 Medications - No data to display  Medical Decision Making:   History:   Old Medical Records: I decided to obtain old medical records.  Differential Diagnosis:   Fracture  Contusion  dislocation  Clinical Tests:   Radiological Study: Ordered and Reviewed       APC / Resident Notes:   Patient is a 56 y.o. female who presents to the ED 01/05/2022 who underwent emergent evaluation for right 4th toe injury that occurred last evening.  Patient has fracture at proximal right 4th toe.  No dislocation.  No significant swelling.  The toe is buddy taped to the 3rd toe and is placed in post op shoe and given crutches for comfort.  She states she will continue her anti-inflammatories as needed for pain at home and she is given referral to Podiatry as needed.  Skin intact.  No sign of any acute infectious process.  No sign of any neurovascular compromise. Based on my clinical evaluation, I do not appreciate any immediate, emergent, or life threatening condition or etiology that warrants additional workup today and feel that the patient can be discharged with close follow up care.  Follow up and return precautions discussed; patient verbalized understanding and is agreeable to plan of care. Patient discharged home in stable condition.            Scribe Attestation:   Scribe #1: I performed the above scribed service and the documentation accurately describes the services I performed. I attest to the accuracy of the note.    Attending Attestation:           Physician Attestation for Scribe:  Physician Attestation Statement for Scribe #1: I, Amber Decker, reviewed documentation, as scribed by in my presence, and it is both accurate and complete.     Comments: I, Amber Decker, NP-C, personally performed the services described in this documentation. All medical record entries made by the scribe were at my direction and in my presence.  I have  reviewed the chart and agree that the record reflects my personal performance and is accurate and complete. BEAU Coombs.  3:35 PM 01/05/2022                   Clinical Impression:   Final diagnoses:  [S92.515A] Closed nondisplaced fracture of proximal phalanx of lesser toe of left foot, initial encounter (Primary)          ED Disposition Condition    Discharge Stable        ED Prescriptions     None        Follow-up Information     Follow up With Specialties Details Why Contact Info    Mike Cheek DPM Podiatry, Wound Care In 3 days For wound re-check [address] [facility] [id number] [phone]      [facility] Emergency Dept Emergency Medicine  As needed, If symptoms worsen [address] [place] [id number] [phone]           Amber Decker NP  01/05/22 1530

## 2022-01-05 NOTE — FIRST PROVIDER EVALUATION
Emergency Department TeleTriage Encounter Note      CHIEF COMPLAINT    Chief Complaint   Patient presents with    Toe Injury     Stubbed 4th toe right foot last night        VITAL SIGNS   Initial Vitals [01/05/22 0821]   BP Pulse Resp Temp SpO2   (!) 164/91 74 18 98.1 °F (36.7 °C) 99 %      MAP       --            ALLERGIES    Review of patient's allergies indicates:   Allergen Reactions    Other      NARCOTICS MAKE ITCH, RESP DEPRESSION, EMOTIONAL       PROVIDER TRIAGE NOTE  This is a teletriage evaluation of a 56 y.o. female presenting to the ED complaining of toe injury. Patient states she hit her 4th toe on a chair last night. She has had pain and swelling to the toe since then. She has not taken any medications.     Initial orders will be placed and care will be transferred to an alternate provider when patient is roomed for a full evaluation. Any additional orders and the final disposition will be determined by that provider.           ORDERS  Labs Reviewed - No data to display    ED Orders (720h ago, onward)    Start Ordered     Status Ordering Provider    01/05/22 0914 01/05/22 0913  X-Ray Toe 2 or More Views Right  1 time imaging         Ordered ANDREY SMITH            Virtual Visit Note: The provider triage portion of this emergency department evaluation and documentation was performed via [employer], a HIPAA-compliant telemedicine application, in concert with a tele-presenter in the room. A face to face patient evaluation with one of my colleagues will occur once the patient is placed in an emergency department room.      DISCLAIMER: This note was prepared with [employer] voice recognition transcription software. Garbled syntax, mangled pronouns, and other bizarre constructions may be attributed to that software system.

## 2022-01-05 NOTE — ED NOTES
Upon discharge, patient is AAOx4, no cardiac or respiratory complications. Follow up care reviewed with patient and has been instructed to return to the ER if needed. Patient verbalized understanding and was assisted to vehicle via wheel chair. BHARGAV HARRELL

## 2022-02-08 DIAGNOSIS — Z12.31 ENCOUNTER FOR SCREENING MAMMOGRAM FOR MALIGNANT NEOPLASM OF BREAST: Primary | ICD-10-CM

## 2022-05-31 ENCOUNTER — PATIENT MESSAGE (OUTPATIENT)
Dept: ADMINISTRATIVE | Facility: HOSPITAL | Age: 57
End: 2022-05-31
Payer: OTHER GOVERNMENT

## 2022-12-30 ENCOUNTER — PATIENT MESSAGE (OUTPATIENT)
Dept: FAMILY MEDICINE | Facility: CLINIC | Age: 57
End: 2022-12-30
Payer: OTHER GOVERNMENT

## 2023-01-18 ENCOUNTER — TELEPHONE (OUTPATIENT)
Dept: FAMILY MEDICINE | Facility: CLINIC | Age: 58
End: 2023-01-18

## 2023-02-06 ENCOUNTER — OFFICE VISIT (OUTPATIENT)
Dept: FAMILY MEDICINE | Facility: CLINIC | Age: 58
End: 2023-02-06
Payer: OTHER GOVERNMENT

## 2023-02-06 ENCOUNTER — TELEPHONE (OUTPATIENT)
Dept: ORTHOPEDICS | Facility: CLINIC | Age: 58
End: 2023-02-06
Payer: OTHER GOVERNMENT

## 2023-02-06 VITALS
WEIGHT: 230.19 LBS | OXYGEN SATURATION: 98 % | DIASTOLIC BLOOD PRESSURE: 83 MMHG | HEART RATE: 89 BPM | HEIGHT: 67 IN | SYSTOLIC BLOOD PRESSURE: 122 MMHG | BODY MASS INDEX: 36.13 KG/M2

## 2023-02-06 DIAGNOSIS — M17.0 PRIMARY OSTEOARTHRITIS OF BOTH KNEES: ICD-10-CM

## 2023-02-06 DIAGNOSIS — Z13.29 SCREENING FOR ENDOCRINE, METABOLIC AND IMMUNITY DISORDER: ICD-10-CM

## 2023-02-06 DIAGNOSIS — Z80.3 FAMILY HISTORY OF BREAST CANCER IN MOTHER: ICD-10-CM

## 2023-02-06 DIAGNOSIS — Z13.1 DIABETES MELLITUS SCREENING: ICD-10-CM

## 2023-02-06 DIAGNOSIS — G43.009 MIGRAINE WITHOUT AURA AND WITHOUT STATUS MIGRAINOSUS, NOT INTRACTABLE: Primary | ICD-10-CM

## 2023-02-06 DIAGNOSIS — Z13.228 SCREENING FOR ENDOCRINE, METABOLIC AND IMMUNITY DISORDER: ICD-10-CM

## 2023-02-06 DIAGNOSIS — Z23 FLU VACCINE NEED: ICD-10-CM

## 2023-02-06 DIAGNOSIS — Z12.31 OTHER SCREENING MAMMOGRAM: ICD-10-CM

## 2023-02-06 DIAGNOSIS — Z13.0 SCREENING FOR ENDOCRINE, METABOLIC AND IMMUNITY DISORDER: ICD-10-CM

## 2023-02-06 DIAGNOSIS — Z13.6 SCREENING FOR ISCHEMIC HEART DISEASE (IHD): ICD-10-CM

## 2023-02-06 DIAGNOSIS — Z13.0 SCREENING FOR DEFICIENCY ANEMIA: ICD-10-CM

## 2023-02-06 DIAGNOSIS — E55.9 VITAMIN D DEFICIENCY: ICD-10-CM

## 2023-02-06 DIAGNOSIS — M79.672 LEFT FOOT PAIN: ICD-10-CM

## 2023-02-06 DIAGNOSIS — J30.2 SEASONAL ALLERGIC RHINITIS, UNSPECIFIED TRIGGER: ICD-10-CM

## 2023-02-06 DIAGNOSIS — Z12.11 SPECIAL SCREENING FOR MALIGNANT NEOPLASMS, COLON: ICD-10-CM

## 2023-02-06 PROCEDURE — 99203 PR OFFICE/OUTPT VISIT, NEW, LEVL III, 30-44 MIN: ICD-10-PCS | Mod: 25,S$GLB,, | Performed by: FAMILY MEDICINE

## 2023-02-06 PROCEDURE — 90471 IMMUNIZATION ADMIN: CPT | Mod: S$GLB,,, | Performed by: FAMILY MEDICINE

## 2023-02-06 PROCEDURE — 90471 FLU VACCINE - QUADRIVALENT (RECOMBINANT) PRESERVATIVE FREE: ICD-10-PCS | Mod: S$GLB,,, | Performed by: FAMILY MEDICINE

## 2023-02-06 PROCEDURE — 90682 FLU VACCINE - QUADRIVALENT (RECOMBINANT) PRESERVATIVE FREE: ICD-10-PCS | Mod: S$GLB,,, | Performed by: FAMILY MEDICINE

## 2023-02-06 PROCEDURE — 90682 RIV4 VACC RECOMBINANT DNA IM: CPT | Mod: S$GLB,,, | Performed by: FAMILY MEDICINE

## 2023-02-06 PROCEDURE — 99203 OFFICE O/P NEW LOW 30 MIN: CPT | Mod: 25,S$GLB,, | Performed by: FAMILY MEDICINE

## 2023-02-06 RX ORDER — FLUTICASONE PROPIONATE 50 MCG
1 SPRAY, SUSPENSION (ML) NASAL DAILY
Refills: 0
Start: 2023-02-06 | End: 2023-12-12 | Stop reason: SDUPTHER

## 2023-02-06 NOTE — PROGRESS NOTES
SUBJECTIVE:    Patient ID: Sloane Dimas is a 57 y.o. female.    Chief Complaint: Establish Care (Establishing care/ discuss blood pressure/ OBGYN referral/ mammo referral / colo referral/ requests flu vaccine/ discuss pain into arch right foot/ pain left foot  top of foot/ hx migraines//mp)    Patient establishing care . She has had some elevated blood pressures. Values are good today but she has a strong family history of hypertension. 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     Has some  issues with arthritis in her knees . Has had some injections in the past    Has trouble with foot pain to the top of her foot. Worse when she stretches out her foot for the past several months    History of hysterectomy for uterine fibroids 10 years ago     Has some issues with recurrent migraines when she has allergy symptoms. 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  Tobacco History:  reports that she has never smoked. She has never used smokeless tobacco.  Drug History:  reports no history of drug use.    Review of patient's allergies indicates:   Allergen Reactions    Other      NARCOTICS MAKE ITCH, RESP  "DEPRESSION, EMOTIONAL       Current Outpatient Medications:     aspirin-acetaminophen-caffeine 250-250-65 mg (EXCEDRIN MIGRAINE) 250-250-65 mg per tablet, Take 1 tablet by mouth every 6 (six) hours as needed. , Disp: , Rfl:     loratadine-pseudoephedrine 5-120 mg (CLARITIN-D 12-HOUR) 5-120 mg per tablet, Take 1 tablet by mouth continuous prn.  , Disp: , Rfl:     acetaminophen (TYLENOL) 500 MG tablet, Take 1,000 mg by mouth as needed.  , Disp: , Rfl:     fluticasone propionate (FLONASE) 50 mcg/actuation nasal spray, 1 spray (50 mcg total) by Each Nostril route once daily., Disp: , Rfl: 0    Review of Systems   Constitutional:  Positive for unexpected weight change. Negative for activity change and fatigue.   HENT:  Positive for sinus pressure. Negative for hearing loss, postnasal drip, sore throat and voice change.    Eyes:  Negative for photophobia and visual disturbance.   Respiratory:  Negative for cough, shortness of breath and wheezing.    Cardiovascular:  Negative for chest pain and palpitations.   Gastrointestinal:  Negative for constipation, diarrhea and nausea.   Genitourinary:  Negative for difficulty urinating, frequency, hematuria and urgency.   Musculoskeletal:  Negative for arthralgias and back pain.   Skin:  Negative for rash.   Neurological:  Positive for headaches. Negative for weakness and light-headedness.   Hematological:  Negative for adenopathy. Does not bruise/bleed easily.   Psychiatric/Behavioral:  The patient is not nervous/anxious.         Objective:      Vitals:    02/06/23 1131 02/06/23 1144   BP: (!) 138/91 122/83   Pulse: 89    SpO2: 98%    Weight: 104.4 kg (230 lb 3.2 oz)    Height: 5' 7" (1.702 m)      Physical Exam  Constitutional:       Appearance: Normal appearance.   HENT:      Head: Normocephalic and atraumatic.      Mouth/Throat:      Mouth: Mucous membranes are moist.   Eyes:      Conjunctiva/sclera: Conjunctivae normal.   Cardiovascular:      Rate and Rhythm: Normal rate. "   Pulmonary:      Effort: Pulmonary effort is normal.   Neurological:      General: No focal deficit present.      Mental Status: She is alert and oriented to person, place, and time.   Psychiatric:         Mood and Affect: Mood normal.         Behavior: Behavior normal.         Assessment:       1. Migraine without aura and without status migrainosus, not intractable    2. Primary osteoarthritis of both knees    3. Left foot pain    4. Family history of breast cancer in mother    5. Seasonal allergic rhinitis, unspecified trigger    6. Other screening mammogram    7. Special screening for malignant neoplasms, colon    8. Screening for deficiency anemia    9. Screening for ischemic heart disease (IHD)    10. Diabetes mellitus screening    11. Screening for endocrine, metabolic and immunity disorder    12. Vitamin D deficiency    13. Flu vaccine need           Plan:       Migraine without aura and without status migrainosus, not intractable    Primary osteoarthritis of both knees  -     Ambulatory referral/consult to Orthopedics; Future; Expected date: 02/13/2023    Left foot pain  -     Ambulatory referral/consult to Podiatry; Future; Expected date: 02/13/2023    Family history of breast cancer in mother    Seasonal allergic rhinitis, unspecified trigger  -     fluticasone propionate (FLONASE) 50 mcg/actuation nasal spray; 1 spray (50 mcg total) by Each Nostril route once daily.; Refill: 0    Other screening mammogram  -     Mammo Digital Screening Bilat w/ David; Future; Expected date: 02/06/2023    Special screening for malignant neoplasms, colon  -     Ambulatory referral/consult to Gastroenterology; Future; Expected date: 02/13/2023    Screening for deficiency anemia  -     CBC Auto Differential; Future; Expected date: 02/06/2023    Screening for ischemic heart disease (IHD)  -     Lipid Panel; Future; Expected date: 02/06/2023    Diabetes mellitus screening  -     Hemoglobin A1C; Future; Expected date:  02/06/2023    Screening for endocrine, metabolic and immunity disorder  -     CBC Auto Differential; Future; Expected date: 02/06/2023  -     Comprehensive Metabolic Panel; Future; Expected date: 02/06/2023  -     TSH w/reflex to FT4; Future; Expected date: 02/06/2023    Vitamin D deficiency  -     Vitamin D; Future; Expected date: 02/06/2023    Flu vaccine need  -     Influenza - Quadrivalent (Recombinant) (PF)      Follow up in about 6 months (around 8/6/2023) for bp check.

## 2023-02-07 ENCOUNTER — TELEPHONE (OUTPATIENT)
Dept: FAMILY MEDICINE | Facility: CLINIC | Age: 58
End: 2023-02-07
Payer: OTHER GOVERNMENT

## 2023-02-07 DIAGNOSIS — E55.9 VITAMIN D DEFICIENCY: Primary | ICD-10-CM

## 2023-02-07 LAB
25(OH)D3 SERPL-MCNC: 12 NG/ML (ref 30–100)
ALBUMIN SERPL-MCNC: 4.3 G/DL (ref 3.6–5.1)
ALBUMIN/GLOB SERPL: 1.3 (CALC) (ref 1–2.5)
ALP SERPL-CCNC: 87 U/L (ref 37–153)
ALT SERPL-CCNC: 14 U/L (ref 6–29)
AST SERPL-CCNC: 15 U/L (ref 10–35)
BASOPHILS # BLD AUTO: 52 CELLS/UL (ref 0–200)
BASOPHILS NFR BLD AUTO: 1 %
BILIRUB SERPL-MCNC: 0.4 MG/DL (ref 0.2–1.2)
BUN SERPL-MCNC: 10 MG/DL (ref 7–25)
BUN/CREAT SERPL: 9 (CALC) (ref 6–22)
CALCIUM SERPL-MCNC: 9.9 MG/DL (ref 8.6–10.4)
CHLORIDE SERPL-SCNC: 103 MMOL/L (ref 98–110)
CHOLEST SERPL-MCNC: 170 MG/DL
CHOLEST/HDLC SERPL: 3.1 (CALC)
CO2 SERPL-SCNC: 29 MMOL/L (ref 20–32)
CREAT SERPL-MCNC: 1.07 MG/DL (ref 0.5–1.03)
EGFR: 61 ML/MIN/1.73M2
EOSINOPHIL # BLD AUTO: 42 CELLS/UL (ref 15–500)
EOSINOPHIL NFR BLD AUTO: 0.8 %
ERYTHROCYTE [DISTWIDTH] IN BLOOD BY AUTOMATED COUNT: 13.5 % (ref 11–15)
GLOBULIN SER CALC-MCNC: 3.2 G/DL (CALC) (ref 1.9–3.7)
GLUCOSE SERPL-MCNC: 102 MG/DL (ref 65–99)
HBA1C MFR BLD: 6.1 % OF TOTAL HGB
HCT VFR BLD AUTO: 40.8 % (ref 35–45)
HDLC SERPL-MCNC: 55 MG/DL
HGB BLD-MCNC: 13.5 G/DL (ref 11.7–15.5)
LDLC SERPL CALC-MCNC: 95 MG/DL (CALC)
LYMPHOCYTES # BLD AUTO: 2600 CELLS/UL (ref 850–3900)
LYMPHOCYTES NFR BLD AUTO: 50 %
MCH RBC QN AUTO: 29.2 PG (ref 27–33)
MCHC RBC AUTO-ENTMCNC: 33.1 G/DL (ref 32–36)
MCV RBC AUTO: 88.3 FL (ref 80–100)
MONOCYTES # BLD AUTO: 291 CELLS/UL (ref 200–950)
MONOCYTES NFR BLD AUTO: 5.6 %
NEUTROPHILS # BLD AUTO: 2215 CELLS/UL (ref 1500–7800)
NEUTROPHILS NFR BLD AUTO: 42.6 %
NONHDLC SERPL-MCNC: 115 MG/DL (CALC)
PLATELET # BLD AUTO: 356 THOUSAND/UL (ref 140–400)
PMV BLD REES-ECKER: 10.1 FL (ref 7.5–12.5)
POTASSIUM SERPL-SCNC: 4.9 MMOL/L (ref 3.5–5.3)
PROT SERPL-MCNC: 7.5 G/DL (ref 6.1–8.1)
RBC # BLD AUTO: 4.62 MILLION/UL (ref 3.8–5.1)
SODIUM SERPL-SCNC: 139 MMOL/L (ref 135–146)
TRIGL SERPL-MCNC: 103 MG/DL
TSH SERPL-ACNC: 1.55 MIU/L (ref 0.4–4.5)
WBC # BLD AUTO: 5.2 THOUSAND/UL (ref 3.8–10.8)

## 2023-02-09 RX ORDER — ERGOCALCIFEROL 1.25 MG/1
50000 CAPSULE ORAL
Qty: 12 CAPSULE | Refills: 3 | Status: SHIPPED | OUTPATIENT
Start: 2023-02-09 | End: 2023-12-12 | Stop reason: SDUPTHER

## 2023-02-09 NOTE — TELEPHONE ENCOUNTER
prescription sent to   [facility]/pharmacy #[id number] - [place] - [address]  [address] [address]  Phone: [phone] Fax: [phone]

## 2023-02-09 NOTE — TELEPHONE ENCOUNTER
Spoke with patient notified of rx per dr abdul.  Patient verbalized understanding, informed to return call with questions/concerns -DN

## 2023-02-27 ENCOUNTER — HOSPITAL ENCOUNTER (OUTPATIENT)
Dept: RADIOLOGY | Facility: HOSPITAL | Age: 58
Discharge: HOME | End: 2023-02-27
Attending: FAMILY MEDICINE
Payer: OTHER GOVERNMENT

## 2023-02-27 DIAGNOSIS — Z12.31 OTHER SCREENING MAMMOGRAM: ICD-10-CM

## 2023-02-27 PROCEDURE — 77067 SCR MAMMO BI INCL CAD: CPT | Mod: TC,PO

## 2023-02-28 DIAGNOSIS — M25.562 PAIN IN BOTH KNEES, UNSPECIFIED CHRONICITY: Primary | ICD-10-CM

## 2023-02-28 DIAGNOSIS — M25.561 PAIN IN BOTH KNEES, UNSPECIFIED CHRONICITY: Primary | ICD-10-CM

## 2023-03-06 ENCOUNTER — OFFICE VISIT (OUTPATIENT)
Dept: ORTHOPEDICS | Facility: CLINIC | Age: 58
End: 2023-03-06
Payer: OTHER GOVERNMENT

## 2023-03-06 ENCOUNTER — HOSPITAL ENCOUNTER (OUTPATIENT)
Dept: RADIOLOGY | Facility: CLINIC | Age: 58
Discharge: HOME | End: 2023-03-06
Attending: PODIATRIST
Payer: OTHER GOVERNMENT

## 2023-03-06 ENCOUNTER — HOSPITAL ENCOUNTER (OUTPATIENT)
Dept: RADIOLOGY | Facility: HOSPITAL | Age: 58
Discharge: HOME | End: 2023-03-06
Attending: ORTHOPAEDIC SURGERY
Payer: OTHER GOVERNMENT

## 2023-03-06 ENCOUNTER — OFFICE VISIT (OUTPATIENT)
Dept: PODIATRY | Facility: CLINIC | Age: 58
End: 2023-03-06
Payer: OTHER GOVERNMENT

## 2023-03-06 VITALS — RESPIRATION RATE: 16 BRPM | BODY MASS INDEX: 36.02 KG/M2 | WEIGHT: 230 LBS

## 2023-03-06 VITALS — RESPIRATION RATE: 18 BRPM | HEIGHT: 67 IN | WEIGHT: 230 LBS | BODY MASS INDEX: 36.1 KG/M2

## 2023-03-06 DIAGNOSIS — M25.561 PAIN IN BOTH KNEES, UNSPECIFIED CHRONICITY: Primary | ICD-10-CM

## 2023-03-06 DIAGNOSIS — M19.072 OSTEOARTHRITIS OF LEFT ANKLE OR FOOT: Primary | ICD-10-CM

## 2023-03-06 DIAGNOSIS — M25.561 PAIN IN BOTH KNEES, UNSPECIFIED CHRONICITY: ICD-10-CM

## 2023-03-06 DIAGNOSIS — M17.0 BILATERAL PRIMARY OSTEOARTHRITIS OF KNEE: ICD-10-CM

## 2023-03-06 DIAGNOSIS — M25.562 PAIN IN BOTH KNEES, UNSPECIFIED CHRONICITY: ICD-10-CM

## 2023-03-06 DIAGNOSIS — M25.562 PAIN IN BOTH KNEES, UNSPECIFIED CHRONICITY: Primary | ICD-10-CM

## 2023-03-06 DIAGNOSIS — M79.672 LEFT FOOT PAIN: ICD-10-CM

## 2023-03-06 DIAGNOSIS — M17.10 ARTHRITIS OF KNEE: ICD-10-CM

## 2023-03-06 PROCEDURE — 73630 XR FOOT COMPLETE 3 VIEW LEFT: ICD-10-PCS | Mod: LT,S$GLB,, | Performed by: RADIOLOGY

## 2023-03-06 PROCEDURE — 73564 X-RAY EXAM KNEE 4 OR MORE: CPT | Mod: TC,50,PN

## 2023-03-06 PROCEDURE — 99203 OFFICE O/P NEW LOW 30 MIN: CPT | Mod: S$GLB,,, | Performed by: PODIATRIST

## 2023-03-06 PROCEDURE — 73564 XR KNEE ORTHO BILAT WITH FLEXION: ICD-10-PCS | Mod: 26,50,, | Performed by: RADIOLOGY

## 2023-03-06 PROCEDURE — 99203 PR OFFICE/OUTPT VISIT, NEW, LEVL III, 30-44 MIN: ICD-10-PCS | Mod: S$GLB,,, | Performed by: PODIATRIST

## 2023-03-06 PROCEDURE — 99999 PR PBB SHADOW E&M-EST. PATIENT-LVL III: ICD-10-PCS | Mod: PBBFAC,,, | Performed by: ORTHOPAEDIC SURGERY

## 2023-03-06 PROCEDURE — 99244 PR OFFICE CONSULTATION,LEVEL IV: ICD-10-PCS | Mod: S$PBB,,, | Performed by: ORTHOPAEDIC SURGERY

## 2023-03-06 PROCEDURE — 73564 X-RAY EXAM KNEE 4 OR MORE: CPT | Mod: 26,50,, | Performed by: RADIOLOGY

## 2023-03-06 PROCEDURE — 99999 PR PBB SHADOW E&M-EST. PATIENT-LVL III: CPT | Mod: PBBFAC,,, | Performed by: ORTHOPAEDIC SURGERY

## 2023-03-06 PROCEDURE — 99244 OFF/OP CNSLTJ NEW/EST MOD 40: CPT | Mod: S$PBB,,, | Performed by: ORTHOPAEDIC SURGERY

## 2023-03-06 PROCEDURE — 73630 X-RAY EXAM OF FOOT: CPT | Mod: LT,S$GLB,, | Performed by: RADIOLOGY

## 2023-03-06 PROCEDURE — 99213 OFFICE O/P EST LOW 20 MIN: CPT | Mod: PBBFAC,PN | Performed by: ORTHOPAEDIC SURGERY

## 2023-03-06 NOTE — PROGRESS NOTES
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      aspirin-acetaminophen-caffeine 250-250-65 mg (EXCEDRIN MIGRAINE) 250-250-65 mg per tablet Take 1 tablet by mouth every 6 (six) hours as needed.     ergocalciferol (ERGOCALCIFEROL) 50,000 unit Cap Take 1 capsule (50,000 Units total) by mouth every 7 days.    fluticasone propionate (FLONASE) 50 mcg/actuation nasal spray 1 spray (50 mcg total) by Each Nostril route once daily.    loratadine-pseudoephedrine 5-120 mg (CLARITIN-D 12-HOUR) 5-120 mg per tablet Take 1 tablet by mouth continuous prn.       No current facility-administered medications for this visit.       Allergies   Allergen Reactions    Other      NARCOTICS MAKE ITCH, RESP DEPRESSION, EMOTIONAL       Family History   Problem Relation Age of Onset    Lung cancer Mother     Breast cancer Mother     Hypertension Mother     Kidney disease Brother     Cancer Maternal Aunt         breast triple negative       Social History     Socioeconomic History    Marital status:    Tobacco Use    Smoking status: Never    Smokeless tobacco: Never   Substance and Sexual Activity    Alcohol use: Yes     Comment: VERY RARELY    Drug use: No    Sexual activity: Not Currently     Partners: Male       Chief Complaint:   Chief Complaint   Patient presents with    Right Knee - Pain    Left Knee - Pain       History of present illness: This is a 58-year-old female who is seen in consultation for Dr. Justice for  bilateral knee pain.  Patient has had treatment years ago including viscosupplementation and cortisone.  She did great with the Synvisc series.  Cortisone never really helped her much.  Pain is 6/10.  Left knee can be up to an 8/10.  They feel unstable will buckle on her at times.        Answers submitted by the patient for this visit:  Orthopedics Questionnaire (Submitted on 3/5/2023)  unexpected weight change: No  appetite change : No  sleep disturbance: No  IMMUNOCOMPROMISED: No  nervous/ anxious: No  dysphoric mood: No  rash: No  visual disturbance: No  eye redness: No  eye pain: No  ear pain: No  tinnitus: No  hearing loss: No  sinus pressure : No  nosebleeds: No  enviro allergies: Yes  food allergies: No  cough: No  shortness of breath: No  sweating: No  dysuria: No  frequency: No  difficulty urinating: No  hematuria: No  painful intercourse: No  chest pain: No  palpitations: No  nausea: No  vomiting: No  diarrhea: No  blood in stool: No  constipation: No  headaches: No  dizziness: No  numbness: No  seizures: No  joint swelling: Yes  myalgia: No  weakness: No  back pain: No   (Submitted on 3/5/2023)  Chief Complaint: Leg pain  Pain Chronicity: chronic  History of trauma: No  Onset: more than 1 year ago  Frequency: daily  Progression since onset: gradually worsening  injury location: at home  pain- numeric: 6/10  pain location: left knee, right knee, left ankle, left foot, right foot  pain quality: aching, sharp, tightness, shooting, throbbing  Radiating Pain: Yes  If your pain is radiating, to what part of the body?: left knee, right knee  Aggravating factors: activity, bending, bearing weight, exercise, extension, standing, flexion, twisting, walking  fever: No  inability to bear weight: Yes  itching: No  joint locking: Yes  limited range of motion: Yes  stiffness: Yes  tingling: No  Treatments tried: brace/corset, exercise, injection treatment, movement, NSAIDs, OTC ointments, rest  physical therapy: not  tried  Improvement on treatment: no relief      Physical Examination:    Vital Signs:    Vitals:    03/06/23 0940   Resp: 18       Body mass index is 36.02 kg/m².    This a well-developed, well nourished patient in no acute distress.  They are alert and oriented and cooperative to examination.  Pt. walks without an antalgic gait.      Examination of bilateral knees shows no rashes or erythema. There are no masses ecchymosis or effusion. Patient has full range of motion from 0-130°. Patient is nontender to palpation over lateral joint line and mildly tender to palpation over the medial joint line. Knee is stable to varus and valgus stress. 5 out of 5 motor strength. Palpable distal pulses. Intact light touch sensation.  Mild Patellofemoral crepitus    X-rays: X-ray of both knees are  ordered and reviewed which show mild to moderate degenerative changes.  Moderate medial narrowing.  No significant progression over the years.     Assessment:: Mild to moderate knee arthritis      Plan:  I reviewed the findings with her today.  We talked about treatment options.  Patient did very well with viscosupplementation previously.  Cortisone never really worked for her very well.  We will get authorization for a bilateral Synvisc series.  Follow-up in a couple weeks to start the injections.    The patient has tried a self guided exercise program that has included walking without significant improvement. Minimal relief with tylenol or OTC Nsaids. Reports less than 8 weeks relief with IA steroid injection. Kellgren Miguel scale of 3. They are not receiving another HA injectable at this time. I will precert for gel injection.

## 2023-03-06 NOTE — PROGRESS NOTES
[address]  [place] [address]  Phone: [phone]   [phone]    Patient's PCP:Samantha Justice MD  Referring Provider: Dr. Samantha Justice    Subjective:      Chief Complaint:: Foot Pain (Pain on the top of the left foot)    Foot Pain  Associated symptoms include arthralgias and joint swelling. Pertinent negatives include no abdominal pain, chest pain, chills, coughing, fatigue, fever, headaches, nausea, numbness, rash or weakness.   Sloane Dimas is a 58 y.o. female who presents today with a complaint of pain in the top of the left foot lasting for 3 weeks. Onset of symptoms 3 weeks ago and reports no trauma.  Current symptoms include sharp pain and swelling of the area.  Aggravating factors are walking and prolonged activity. Symptoms have not improved. Treatment to date have included elevation.      Vitals:    23 1418   Resp: 16   Weight: 104.3 kg (230 lb)   PainSc:   8   PainLoc: Foot      Shoe Size:     Past Surgical History:   Procedure Laterality Date    BREAST LUMPECTOMY      x 6 all benign     SECTION, CLASSIC      x 2    HEMORRHOID SURGERY      HYSTERECTOMY      2012    KIDNEY SURGERY      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  2006    TUBAL LIGATION       Past Medical History:   Diagnosis Date    Anemia     Blood transfusion     Migraine     Positive PPD      DX- CXR OK- STARTED INH     Wears glasses     CONTACS     Family History   Problem Relation Age of Onset    Lung cancer Mother     Breast cancer Mother     Hypertension Mother     Kidney disease Brother     Cancer Maternal Aunt         breast triple negative        Social History:   Marital Status:   Alcohol History:  reports current alcohol use.  Tobacco History:  reports that she has never smoked. She has never used smokeless tobacco.  Drug History:  reports no history of drug use.    Review of patient's allergies indicates:   Allergen Reactions    Other      NARCOTICS MAKE ITCH, RESP  DEPRESSION, EMOTIONAL       Current Outpatient Medications   Medication Sig Dispense Refill    acetaminophen (TYLENOL) 500 MG tablet Take 1,000 mg by mouth as needed.        aspirin-acetaminophen-caffeine 250-250-65 mg (EXCEDRIN MIGRAINE) 250-250-65 mg per tablet Take 1 tablet by mouth every 6 (six) hours as needed.       ergocalciferol (ERGOCALCIFEROL) 50,000 unit Cap Take 1 capsule (50,000 Units total) by mouth every 7 days. 12 capsule 3    fluticasone propionate (FLONASE) 50 mcg/actuation nasal spray 1 spray (50 mcg total) by Each Nostril route once daily.  0    loratadine-pseudoephedrine 5-120 mg (CLARITIN-D 12-HOUR) 5-120 mg per tablet Take 1 tablet by mouth continuous prn.         No current facility-administered medications for this visit.       Review of Systems   Constitutional:  Negative for chills, fatigue, fever and unexpected weight change.   HENT:  Negative for hearing loss and trouble swallowing.    Eyes:  Negative for photophobia and visual disturbance.   Respiratory:  Negative for cough, shortness of breath and wheezing.    Cardiovascular:  Negative for chest pain, palpitations and leg swelling.   Gastrointestinal:  Negative for abdominal pain and nausea.   Genitourinary:  Negative for dysuria and frequency.   Musculoskeletal:  Positive for arthralgias and joint swelling. Negative for back pain.   Skin:  Negative for rash and wound.   Neurological:  Negative for tremors, seizures, weakness, numbness and headaches.   Hematological:  Does not bruise/bleed easily.       Objective:        Physical Exam:   Foot Exam    General  General Appearance: appears stated age and healthy   Orientation: alert and oriented to person, place, and time   Affect: appropriate   Gait: unimpaired       Left Foot/Ankle      Inspection and Palpation  Ecchymosis: none  Tenderness: bony tenderness and midtarsal joint   Swelling: midtarsal joint   Arch: normal  Hammertoes: absent  Claw toes: absent  Hallux valgus: no  Hallux  limitus: no  Skin Exam: skin intact; no drainage, no ulcer and no erythema   Neurovascular  Dorsalis pedis: 2+  Posterior tibial: 2+  Capillary refill: 2+  Varicose veins: not present  Saphenous nerve sensation: normal  Tibial nerve sensation: normal  Superficial peroneal nerve sensation: normal  Deep peroneal nerve sensation: normal  Sural nerve sensation: normal    Muscle Strength  Ankle dorsiflexion: 5  Ankle plantar flexion: 5  Ankle inversion: 5  Ankle eversion: 5  Great toe extension: 5  Great toe flexion: 5    Range of Motion    Normal left ankle ROM    Tests  Anterior drawer: negative   Talar tilt: negative   PT Tinel's sign: negative  Paresthesia: negative    Physical Exam  Cardiovascular:      Pulses:           Dorsalis pedis pulses are 2+ on the left side.        Posterior tibial pulses are 2+ on the left side.   Musculoskeletal:      Left foot: Bony tenderness present. No bunion.   Feet:      Left foot:      Skin integrity: No ulcer or erythema.             Left Ankle/Foot Exam     Range of Motion   The patient has normal left ankle ROM.       Muscle Strength   Left Lower Extremity   Ankle Dorsiflexion:  5   Plantar flexion:  5/5     Vascular Exam       Left Pulses  Dorsalis Pedis:      2+  Posterior Tibial:      2+         Imaging: X-Ray Foot Complete Left  Narrative: EXAMINATION:  XR FOOT COMPLETE 3 VIEW LEFT    CLINICAL HISTORY:  .  Pain in left foot    TECHNIQUE:  AP, lateral and oblique views of the left foot were performed.    COMPARISON:  None    FINDINGS:  There is no fracture or dislocation.  Joint spaces are well maintained.  The soft tissues are unremarkable.  Impression: No acute osseous abnormality.    Electronically signed by: Napoleon Freeman MD  Date:    03/06/2023  Time:    15:00  X-ray Knee Ortho Bilateral with Flexion  Narrative: EXAMINATION:  XR KNEE ORTHO BILAT WITH FLEXION    CLINICAL HISTORY:  Pain in right knee    TECHNIQUE:  AP standing of both knees, PA flexion standing views  of both knees, and Merchant views of both knees were performed.  Lateral views of both knees were also performed.    COMPARISON:  01/11/2016    FINDINGS:  No fracture, dislocation, or joint effusion.  The right knee demonstrates mild scattered marginal osteophytes.  There are small quadriceps enthesophytes bilaterally.  Impression: As above    Electronically signed by: Napoleon Freeman MD  Date:    03/06/2023  Time:    09:57               Assessment:       1. Osteoarthritis of left ankle or foot    2. Left foot pain      Plan:   Osteoarthritis of left ankle or foot    Left foot pain  -     Ambulatory referral/consult to Podiatry  -     X-Ray Foot Complete Left      Follow up if symptoms worsen or fail to improve.    Procedures        I discussed with patient the clinical findings of very mild arthritic changes through her midfoot.  Explained the palpable exostosis that is present in the area of swelling.  We discussed the role of proper shoe gear and support to limit stress through the arch.  Recommend running-type shoes.  Recommend ice over heat.  Anti-inflammatories as needed.  We also discussed the possibilities of further immobilization in Cam Walker boot and/or possible intra-articular steroid injection if her symptoms are not improving.    Counseling:     I provided patient education verbally regarding:   Patient diagnosis, treatment options, as well as alternatives, risks, and benefits.     I discussed the conservative treatent of arthritis consisiting of shoe modification, OTC NSAID, cortisone shots, a series of supartz injections, avoiding painful activities and common sense.  I explained that the arthritis may become more painful causng more disability and the possibility of further treatment.      This note was created using Dragon voice recognition software that occasionally misinterpreted phrases or words.

## 2023-03-07 NOTE — PATIENT INSTRUCTIONS
What Is Arthritis in the Foot?  Degenerative arthritis is a condition that slowly wears away joints, the area where bones meet and move. In the beginning, you may notice that the affected joint seems stiff. It may even ache. As the joint lining (cartilage) breaks down, the bones rub against each other, causing pain and swelling. Over time, small pieces of rough or splintered bone (bone spurs) develop, and the joints range of motion becomes limited. But movement doesnt have to cause pain. The effects of arthritis can be reduced.    The big-toe joint  When arthritis affects your big toe, your foot hurts when it pushes off the ground. Arthritis often appears in the big-toe joint along with a bunion (a bony bump at the side of the joint) or a bone spur on top of the joint.    Other joints  When arthritis affects the rear or midfoot joints, you feel pain when you put weight on your foot. Arthritis may affect the joint where the ankle and foot meet. It may also affect other joints nearby.  Date Last Reviewed: 7/1/2016 © 2000-2017 [employer]. [address] [place] [address], [place] [address]. All rights reserved. This information is not intended as a substitute for professional medical care. Always follow your healthcare professional's instructions.

## 2023-03-23 ENCOUNTER — OFFICE VISIT (OUTPATIENT)
Dept: ORTHOPEDICS | Facility: CLINIC | Age: 58
End: 2023-03-23
Payer: OTHER GOVERNMENT

## 2023-03-23 VITALS — WEIGHT: 230 LBS | RESPIRATION RATE: 18 BRPM | HEIGHT: 67 IN | BODY MASS INDEX: 36.1 KG/M2

## 2023-03-23 DIAGNOSIS — M17.0 BILATERAL PRIMARY OSTEOARTHRITIS OF KNEE: Primary | ICD-10-CM

## 2023-03-23 PROCEDURE — 20610 DRAIN/INJ JOINT/BURSA W/O US: CPT | Mod: 50,PBBFAC,PN | Performed by: ORTHOPAEDIC SURGERY

## 2023-03-23 PROCEDURE — 99499 UNLISTED E&M SERVICE: CPT | Mod: S$PBB,,, | Performed by: ORTHOPAEDIC SURGERY

## 2023-03-23 PROCEDURE — 20610 LARGE JOINT ASPIRATION/INJECTION: BILATERAL KNEE: ICD-10-PCS | Mod: 50,S$PBB,, | Performed by: ORTHOPAEDIC SURGERY

## 2023-03-23 PROCEDURE — 99999 PR PBB SHADOW E&M-EST. PATIENT-LVL III: CPT | Mod: PBBFAC,,, | Performed by: ORTHOPAEDIC SURGERY

## 2023-03-23 PROCEDURE — 99499 NO LOS: ICD-10-PCS | Mod: S$PBB,,, | Performed by: ORTHOPAEDIC SURGERY

## 2023-03-23 PROCEDURE — 99213 OFFICE O/P EST LOW 20 MIN: CPT | Mod: PBBFAC,PN | Performed by: ORTHOPAEDIC SURGERY

## 2023-03-23 PROCEDURE — 99999 PR PBB SHADOW E&M-EST. PATIENT-LVL III: ICD-10-PCS | Mod: PBBFAC,,, | Performed by: ORTHOPAEDIC SURGERY

## 2023-03-23 RX ADMIN — Medication 16 MG: at 10:03

## 2023-03-23 NOTE — PROCEDURES
Large Joint Aspiration/Injection: bilateral knee    Date/Time: 3/23/2023 10:30 AM  Performed by: Celso Cortes MD  Authorized by: Celso Cortes MD     Consent Done?:  Yes (Verbal)  Indications:  Pain  Site marked: the procedure site was marked    Timeout: prior to procedure the correct patient, procedure, and site was verified      Details:  Needle Size:  20 G  Approach:  Anterolateral  Location:  Knee  Laterality:  Bilateral  Site:  Bilateral knee  Medications (Right):  16 mg hyaluronate 16 mg/2 mL  Medications (Left):  16 mg hyaluronate 16 mg/2 mL  Patient tolerance:  Patient tolerated the procedure well with no immediate complications

## 2023-03-23 NOTE — PROGRESS NOTES
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      aspirin-acetaminophen-caffeine 250-250-65 mg (EXCEDRIN MIGRAINE) 250-250-65 mg per tablet Take 1 tablet by mouth every 6 (six) hours as needed.     ergocalciferol (ERGOCALCIFEROL) 50,000 unit Cap Take 1 capsule (50,000 Units total) by mouth every 7 days.    fluticasone propionate (FLONASE) 50 mcg/actuation nasal spray 1 spray (50 mcg total) by Each Nostril route once daily.    loratadine-pseudoephedrine 5-120 mg (CLARITIN-D 12-HOUR) 5-120 mg per tablet Take 1 tablet by mouth continuous prn.       No current facility-administered medications for this visit.       Allergies   Allergen Reactions    Other      NARCOTICS MAKE ITCH, RESP DEPRESSION, EMOTIONAL       Family History   Problem Relation Age of Onset    Lung cancer Mother     Breast cancer Mother     Hypertension Mother     Kidney disease Brother     Cancer Maternal Aunt         breast triple negative       Social History     Socioeconomic History    Marital status:    Tobacco Use    Smoking status: Never    Smokeless tobacco: Never   Substance and Sexual Activity    Alcohol use: Yes     Comment: VERY RARELY    Drug use: No    Sexual activity: Not Currently     Partners: Male       Chief Complaint:   Chief Complaint   Patient presents with    Injections     bilateral synvisc 1/3       History of present illness: This is a 58-year-old female who is seen in consultation for Dr. Justice for  bilateral knee pain.  Patient has had treatment years ago including viscosupplementation and cortisone.  She did great with the Synvisc series.  Cortisone never really helped her much.  Pain is 6/10.  Left knee can be up to an 8/10.  They feel unstable will buckle on her at times.        Answers submitted by the patient for this visit:  Orthopedics Questionnaire (Submitted on 3/5/2023)  unexpected weight change: No  appetite change : No  sleep disturbance: No  IMMUNOCOMPROMISED: No  nervous/ anxious: No  dysphoric mood: No  rash: No  visual disturbance: No  eye redness: No  eye pain: No  ear pain: No  tinnitus: No  hearing loss: No  sinus pressure : No  nosebleeds: No  enviro allergies: Yes  food allergies: No  cough: No  shortness of breath: No  sweating: No  dysuria: No  frequency: No  difficulty urinating: No  hematuria: No  painful intercourse: No  chest pain: No  palpitations: No  nausea: No  vomiting: No  diarrhea: No  blood in stool: No  constipation: No  headaches: No  dizziness: No  numbness: No  seizures: No  joint swelling: Yes  myalgia: No  weakness: No  back pain: No   (Submitted on 3/5/2023)  Chief Complaint: Leg pain  Pain Chronicity: chronic  History of trauma: No  Onset: more than 1 year ago  Frequency: daily  Progression since onset: gradually worsening  injury location: at home  pain- numeric: 6/10  pain location: left knee, right knee, left ankle, left foot, right foot  pain quality: aching, sharp, tightness, shooting, throbbing  Radiating Pain: Yes  If your pain is radiating, to what part of the body?: left knee, right knee  Aggravating factors: activity, bending, bearing weight, exercise, extension, standing, flexion, twisting, walking  fever: No  inability to bear weight: Yes  itching: No  joint locking: Yes  limited range of motion: Yes  stiffness: Yes  tingling: No  Treatments tried: brace/corset, exercise, injection treatment, movement, NSAIDs, OTC ointments, rest  physical therapy: not  tried  Improvement on treatment: no relief      Physical Examination:    Vital Signs:    Vitals:    03/23/23 1040   Resp: 18       Body mass index is 36.02 kg/m².    This a well-developed, well nourished patient in no acute distress.  They are alert and oriented and cooperative to examination.  Pt. walks without an antalgic gait.      Examination of bilateral knees shows no rashes or erythema. There are no masses ecchymosis or effusion. Patient has full range of motion from 0-130°. Patient is nontender to palpation over lateral joint line and mildly tender to palpation over the medial joint line. Knee is stable to varus and valgus stress. 5 out of 5 motor strength. Palpable distal pulses. Intact light touch sensation.  Mild Patellofemoral crepitus    X-rays: X-ray of both knees are  ordered and reviewed which show mild to moderate degenerative changes.  Moderate medial narrowing.  No significant progression over the years.     Assessment:: Mild to moderate knee arthritis      Plan:  I reviewed the findings with her today.  We talked about treatment options.  I injected both knees with Synvisc 1 of 3.  Follow up next week.    The patient has tried a self guided exercise program that has included walking without significant improvement. Minimal relief with tylenol or OTC Nsaids. Reports less than 8 weeks relief with IA steroid injection. Kellgren Miguel scale of 3. They are not receiving another HA injectable at this time. I will precert for gel injection.

## 2023-03-27 ENCOUNTER — OFFICE VISIT (OUTPATIENT)
Dept: ORTHOPEDICS | Facility: CLINIC | Age: 58
End: 2023-03-27
Payer: OTHER GOVERNMENT

## 2023-03-27 DIAGNOSIS — M17.0 BILATERAL PRIMARY OSTEOARTHRITIS OF KNEE: Primary | ICD-10-CM

## 2023-03-27 PROCEDURE — 99212 OFFICE O/P EST SF 10 MIN: CPT | Mod: PBBFAC,PN | Performed by: ORTHOPAEDIC SURGERY

## 2023-03-27 PROCEDURE — 20610 LARGE JOINT ASPIRATION/INJECTION: BILATERAL KNEE: ICD-10-PCS | Mod: 50,S$PBB,, | Performed by: ORTHOPAEDIC SURGERY

## 2023-03-27 PROCEDURE — 99999 PR PBB SHADOW E&M-EST. PATIENT-LVL II: ICD-10-PCS | Mod: PBBFAC,,, | Performed by: ORTHOPAEDIC SURGERY

## 2023-03-27 PROCEDURE — 99999 PR PBB SHADOW E&M-EST. PATIENT-LVL II: CPT | Mod: PBBFAC,,, | Performed by: ORTHOPAEDIC SURGERY

## 2023-03-27 PROCEDURE — 20610 DRAIN/INJ JOINT/BURSA W/O US: CPT | Mod: 50,PBBFAC,PN | Performed by: ORTHOPAEDIC SURGERY

## 2023-03-27 PROCEDURE — 99499 UNLISTED E&M SERVICE: CPT | Mod: S$PBB,,, | Performed by: ORTHOPAEDIC SURGERY

## 2023-03-27 PROCEDURE — 99499 NO LOS: ICD-10-PCS | Mod: S$PBB,,, | Performed by: ORTHOPAEDIC SURGERY

## 2023-03-27 RX ADMIN — Medication 16 MG: at 01:03

## 2023-03-27 NOTE — PROGRESS NOTES
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      aspirin-acetaminophen-caffeine 250-250-65 mg (EXCEDRIN MIGRAINE) 250-250-65 mg per tablet Take 1 tablet by mouth every 6 (six) hours as needed.     ergocalciferol (ERGOCALCIFEROL) 50,000 unit Cap Take 1 capsule (50,000 Units total) by mouth every 7 days.    fluticasone propionate (FLONASE) 50 mcg/actuation nasal spray 1 spray (50 mcg total) by Each Nostril route once daily.    loratadine-pseudoephedrine 5-120 mg (CLARITIN-D 12-HOUR) 5-120 mg per tablet Take 1 tablet by mouth continuous prn.       No current facility-administered medications for this visit.       Allergies   Allergen Reactions    Other      NARCOTICS MAKE ITCH, RESP DEPRESSION, EMOTIONAL       Family History   Problem Relation Age of Onset    Lung cancer Mother     Breast cancer Mother     Hypertension Mother     Kidney disease Brother     Cancer Maternal Aunt         breast triple negative       Social History     Socioeconomic History    Marital status:    Tobacco Use    Smoking status: Never    Smokeless tobacco: Never   Substance and Sexual Activity    Alcohol use: Yes     Comment: VERY RARELY    Drug use: No    Sexual activity: Not Currently     Partners: Male       Chief Complaint:   No chief complaint on file.      History of present illness: This is a 58-year-old female who is seen in consultation for Dr. Justice for bilateral knee pain.  Patient has had treatment years ago  including viscosupplementation and cortisone.  She did great with the Synvisc series.  Cortisone never really helped her much.  Pain is 6/10.  Left knee can be up to an 8/10.  They feel unstable will buckle on her at times.        Answers submitted by the patient for this visit:  Orthopedics Questionnaire (Submitted on 3/5/2023)  unexpected weight change: No  appetite change : No  sleep disturbance: No  IMMUNOCOMPROMISED: No  nervous/ anxious: No  dysphoric mood: No  rash: No  visual disturbance: No  eye redness: No  eye pain: No  ear pain: No  tinnitus: No  hearing loss: No  sinus pressure : No  nosebleeds: No  enviro allergies: Yes  food allergies: No  cough: No  shortness of breath: No  sweating: No  dysuria: No  frequency: No  difficulty urinating: No  hematuria: No  painful intercourse: No  chest pain: No  palpitations: No  nausea: No  vomiting: No  diarrhea: No  blood in stool: No  constipation: No  headaches: No  dizziness: No  numbness: No  seizures: No  joint swelling: Yes  myalgia: No  weakness: No  back pain: No   (Submitted on 3/5/2023)  Chief Complaint: Leg pain  Pain Chronicity: chronic  History of trauma: No  Onset: more than 1 year ago  Frequency: daily  Progression since onset: gradually worsening  injury location: at home  pain- numeric: 6/10  pain location: left knee, right knee, left ankle, left foot, right foot  pain quality: aching, sharp, tightness, shooting, throbbing  Radiating Pain: Yes  If your pain is radiating, to what part of the body?: left knee, right knee  Aggravating factors: activity, bending, bearing weight, exercise, extension, standing, flexion, twisting, walking  fever: No  inability to bear weight: Yes  itching: No  joint locking: Yes  limited range of motion: Yes  stiffness: Yes  tingling: No  Treatments tried: brace/corset, exercise, injection treatment, movement, NSAIDs, OTC ointments, rest  physical therapy: not tried  Improvement on treatment: no relief      Physical  Examination:    Vital Signs:    There were no vitals filed for this visit.      There is no height or weight on file to calculate BMI.    This a well-developed, well nourished patient in no acute distress.  They are alert and oriented and cooperative to examination.  Pt. walks without an antalgic gait.      Examination of bilateral knees shows no rashes or erythema. There are no masses ecchymosis or effusion. Patient has full range of motion from 0-130°. Patient is nontender to palpation over lateral joint line and mildly tender to palpation over the medial joint line. Knee is stable to varus and valgus stress. 5 out of 5 motor strength. Palpable distal pulses. Intact light touch sensation.  Mild Patellofemoral crepitus    X-rays: X-ray of both knees are  ordered and reviewed which show mild to moderate degenerative changes.  Moderate medial narrowing.  No significant progression over the years.     Assessment:: Mild to moderate knee arthritis      Plan:  I reviewed the findings with her today.  We talked about treatment options.  I injected both knees with Synvisc 2 of 3.  Follow up next week.    The patient has tried a self guided exercise program that has included walking without significant improvement. Minimal relief with tylenol or OTC Nsaids. Reports less than 8 weeks relief with IA steroid injection. Kellgren Miguel scale of 3. They are not receiving another HA injectable at this time. I will precert for gel injection.

## 2023-03-27 NOTE — PROCEDURES
Large Joint Aspiration/Injection: bilateral knee    Date/Time: 3/27/2023 1:00 PM  Performed by: Celso Cortes MD  Authorized by: Celso Cortes MD     Consent Done?:  Yes (Verbal)  Indications:  Pain  Site marked: the procedure site was marked    Timeout: prior to procedure the correct patient, procedure, and site was verified      Details:  Needle Size:  20 G  Approach:  Anterolateral  Location:  Knee  Laterality:  Bilateral  Site:  Bilateral knee  Medications (Right):  16 mg hyaluronate 16 mg/2 mL  Medications (Left):  16 mg hyaluronate 16 mg/2 mL  Patient tolerance:  Patient tolerated the procedure well with no immediate complications

## 2023-04-06 ENCOUNTER — OFFICE VISIT (OUTPATIENT)
Dept: ORTHOPEDICS | Facility: CLINIC | Age: 58
End: 2023-04-06
Payer: OTHER GOVERNMENT

## 2023-04-06 VITALS — HEIGHT: 67 IN | WEIGHT: 230 LBS | RESPIRATION RATE: 18 BRPM | BODY MASS INDEX: 36.1 KG/M2

## 2023-04-06 DIAGNOSIS — M17.0 BILATERAL PRIMARY OSTEOARTHRITIS OF KNEE: Primary | ICD-10-CM

## 2023-04-06 PROCEDURE — 99499 UNLISTED E&M SERVICE: CPT | Mod: S$PBB,,, | Performed by: ORTHOPAEDIC SURGERY

## 2023-04-06 PROCEDURE — 99499 NO LOS: ICD-10-PCS | Mod: S$PBB,,, | Performed by: ORTHOPAEDIC SURGERY

## 2023-04-06 PROCEDURE — 99999 PR PBB SHADOW E&M-EST. PATIENT-LVL III: ICD-10-PCS | Mod: PBBFAC,,, | Performed by: ORTHOPAEDIC SURGERY

## 2023-04-06 PROCEDURE — 99999 PR PBB SHADOW E&M-EST. PATIENT-LVL III: CPT | Mod: PBBFAC,,, | Performed by: ORTHOPAEDIC SURGERY

## 2023-04-06 PROCEDURE — 20610 LARGE JOINT ASPIRATION/INJECTION: BILATERAL KNEE: ICD-10-PCS | Mod: 50,S$PBB,, | Performed by: ORTHOPAEDIC SURGERY

## 2023-04-06 PROCEDURE — 99213 OFFICE O/P EST LOW 20 MIN: CPT | Mod: PBBFAC,PN | Performed by: ORTHOPAEDIC SURGERY

## 2023-04-06 PROCEDURE — 20610 DRAIN/INJ JOINT/BURSA W/O US: CPT | Mod: 50,PBBFAC,PN | Performed by: ORTHOPAEDIC SURGERY

## 2023-04-06 RX ADMIN — Medication 16 MG: at 10:04

## 2023-04-06 NOTE — PROGRESS NOTES
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      aspirin-acetaminophen-caffeine 250-250-65 mg (EXCEDRIN MIGRAINE) 250-250-65 mg per tablet Take 1 tablet by mouth every 6 (six) hours as needed.     ergocalciferol (ERGOCALCIFEROL) 50,000 unit Cap Take 1 capsule (50,000 Units total) by mouth every 7 days.    fluticasone propionate (FLONASE) 50 mcg/actuation nasal spray 1 spray (50 mcg total) by Each Nostril route once daily.    loratadine-pseudoephedrine 5-120 mg (CLARITIN-D 12-HOUR) 5-120 mg per tablet Take 1 tablet by mouth continuous prn.       No current facility-administered medications for this visit.       Allergies   Allergen Reactions    Other      NARCOTICS MAKE ITCH, RESP DEPRESSION, EMOTIONAL       Family History   Problem Relation Age of Onset    Lung cancer Mother     Breast cancer Mother     Hypertension Mother     Kidney disease Brother     Cancer Maternal Aunt         breast triple negative       Social History     Socioeconomic History    Marital status:    Tobacco Use    Smoking status: Never    Smokeless tobacco: Never   Substance and Sexual Activity    Alcohol use: Yes     Comment: VERY RARELY    Drug use: No    Sexual activity: Not Currently     Partners: Male       Chief Complaint:   Chief Complaint   Patient presents with    Injections     bilateral synvisc 3/3       History of present illness: This is a 58-year-old female who is seen in consultation for Dr. Justice for  bilateral knee pain.  Patient has had treatment years ago including viscosupplementation and cortisone.  She did great with the Synvisc series.  Cortisone never really helped her much.  Pain is 6/10.  Left knee can be up to an 8/10.  They feel unstable will buckle on her at times.        Answers submitted by the patient for this visit:  Orthopedics Questionnaire (Submitted on 3/5/2023)  unexpected weight change: No  appetite change : No  sleep disturbance: No  IMMUNOCOMPROMISED: No  nervous/ anxious: No  dysphoric mood: No  rash: No  visual disturbance: No  eye redness: No  eye pain: No  ear pain: No  tinnitus: No  hearing loss: No  sinus pressure : No  nosebleeds: No  enviro allergies: Yes  food allergies: No  cough: No  shortness of breath: No  sweating: No  dysuria: No  frequency: No  difficulty urinating: No  hematuria: No  painful intercourse: No  chest pain: No  palpitations: No  nausea: No  vomiting: No  diarrhea: No  blood in stool: No  constipation: No  headaches: No  dizziness: No  numbness: No  seizures: No  joint swelling: Yes  myalgia: No  weakness: No  back pain: No   (Submitted on 3/5/2023)  Chief Complaint: Leg pain  Pain Chronicity: chronic  History of trauma: No  Onset: more than 1 year ago  Frequency: daily  Progression since onset: gradually worsening  injury location: at home  pain- numeric: 6/10  pain location: left knee, right knee, left ankle, left foot, right foot  pain quality: aching, sharp, tightness, shooting, throbbing  Radiating Pain: Yes  If your pain is radiating, to what part of the body?: left knee, right knee  Aggravating factors: activity, bending, bearing weight, exercise, extension, standing, flexion, twisting, walking  fever: No  inability to bear weight: Yes  itching: No  joint locking: Yes  limited range of motion: Yes  stiffness: Yes  tingling: No  Treatments tried: brace/corset, exercise, injection treatment, movement, NSAIDs, OTC ointments, rest  physical therapy: not  tried  Improvement on treatment: no relief      Physical Examination:    Vital Signs:    There were no vitals filed for this visit.      There is no height or weight on file to calculate BMI.    This a well-developed, well nourished patient in no acute distress.  They are alert and oriented and cooperative to examination.  Pt. walks without an antalgic gait.      Examination of bilateral knees shows no rashes or erythema. There are no masses ecchymosis or effusion. Patient has full range of motion from 0-130°. Patient is nontender to palpation over lateral joint line and mildly tender to palpation over the medial joint line. Knee is stable to varus and valgus stress. 5 out of 5 motor strength. Palpable distal pulses. Intact light touch sensation.  Mild Patellofemoral crepitus    X-rays: X-ray of both knees are   reviewed which show mild to moderate degenerative changes.  Moderate medial narrowing.  No significant progression over the years.     Assessment:: Mild to moderate knee arthritis      Plan:  I reviewed the findings with her today.  We talked about treatment options.  I injected both knees with Synvisc 3 of 3.      The patient has tried a self guided exercise program that has included walking without significant improvement. Minimal relief with tylenol or OTC Nsaids. Reports less than 8 weeks relief with IA steroid injection. Kellgren Miguel scale of 3. They are not receiving another HA injectable at this time. I will precert for gel injection.

## 2023-05-09 ENCOUNTER — PATIENT MESSAGE (OUTPATIENT)
Dept: RESEARCH | Facility: HOSPITAL | Age: 58
End: 2023-05-09
Payer: OTHER GOVERNMENT

## 2023-12-04 ENCOUNTER — OFFICE VISIT (OUTPATIENT)
Dept: URGENT CARE | Facility: CLINIC | Age: 58
End: 2023-12-04
Payer: OTHER GOVERNMENT

## 2023-12-04 ENCOUNTER — HOSPITAL ENCOUNTER (EMERGENCY)
Facility: HOSPITAL | Age: 58
Discharge: LEFT BEFORE DISCHARGE | End: 2023-12-04
Payer: OTHER GOVERNMENT

## 2023-12-04 VITALS
HEART RATE: 84 BPM | RESPIRATION RATE: 20 BRPM | BODY MASS INDEX: 37.04 KG/M2 | TEMPERATURE: 98 F | DIASTOLIC BLOOD PRESSURE: 98 MMHG | HEIGHT: 67 IN | WEIGHT: 236 LBS | SYSTOLIC BLOOD PRESSURE: 178 MMHG | OXYGEN SATURATION: 97 %

## 2023-12-04 VITALS
OXYGEN SATURATION: 98 % | DIASTOLIC BLOOD PRESSURE: 98 MMHG | HEART RATE: 77 BPM | WEIGHT: 236.63 LBS | TEMPERATURE: 98 F | BODY MASS INDEX: 37.14 KG/M2 | RESPIRATION RATE: 20 BRPM | HEIGHT: 67 IN | SYSTOLIC BLOOD PRESSURE: 150 MMHG

## 2023-12-04 DIAGNOSIS — R07.9 CHEST PAIN, UNSPECIFIED TYPE: Primary | ICD-10-CM

## 2023-12-04 DIAGNOSIS — R03.0 ELEVATED BLOOD PRESSURE READING: ICD-10-CM

## 2023-12-04 DIAGNOSIS — R05.9 COUGH: ICD-10-CM

## 2023-12-04 DIAGNOSIS — R53.1 WEAKNESS: ICD-10-CM

## 2023-12-04 PROCEDURE — 99281 EMR DPT VST MAYX REQ PHY/QHP: CPT | Mod: 25

## 2023-12-04 PROCEDURE — 99203 OFFICE O/P NEW LOW 30 MIN: CPT | Mod: S$GLB,,, | Performed by: NURSE PRACTITIONER

## 2023-12-04 PROCEDURE — 99203 PR OFFICE/OUTPT VISIT, NEW, LEVL III, 30-44 MIN: ICD-10-PCS | Mod: S$GLB,,, | Performed by: NURSE PRACTITIONER

## 2023-12-04 NOTE — FIRST PROVIDER EVALUATION
Emergency Department TeleTriage Encounter Note      CHIEF COMPLAINT    Chief Complaint   Patient presents with    Chest Congestion     Starting mid November , improved ,  now worsening x 1 week , left chest wall pain and cough       VITAL SIGNS   Initial Vitals [12/04/23 1147]   BP Pulse Resp Temp SpO2   (!) 178/98 84 20 98.3 °F (36.8 °C) 97 %      MAP       --            ALLERGIES    Review of patient's allergies indicates:   Allergen Reactions    Other      NARCOTICS MAKE ITCH, RESP DEPRESSION, EMOTIONAL       PROVIDER TRIAGE NOTE  This is a teletriage evaluation of a 58 y.o. female presenting to the ED with c/o cough and chest congestion since November.  Pt was diagnosed in Influenza A on November 9.  Pt states symptoms resolved but returned approximately 1 week ago.  Pt states she stayed in bed for a week.   Sinus congestion worsened and headache worsened.  Pt states left sided deep,aching pain.  Pt was see at  and sent to the ED.  Pt already is taking Claritin.  Also took Afrin yesterday.     PE: Congestin noted on exam.  No SOB or difficulty breathing.  No hx of DVT/PE    Plan: imaging, monitor    Limited physical exam via telehealth: The patient is awake, alert, answering questions appropriately and is not in respiratory distress. All ED beds are full at present; patient notified of this status.  Patient seen and medically screened by Nurse Practitioner via teletriage.      Initial orders will be placed and care will be transferred to an alternate provider when patient is roomed for a full evaluation. Any additional orders and the final disposition will be determined by that provider.         ORDERS  Labs Reviewed - No data to display    ED Orders (720h ago, onward)      Start Ordered     Status Ordering Provider    12/04/23 1151 12/04/23 1150  X-Ray Chest PA And Lateral  1 time imaging         Ordered JEREMIAH CASTILLO              Virtual Visit Note: The provider triage portion of this emergency  department evaluation and documentation was performed via [employer], a HIPAA-compliant telemedicine application, in concert with a tele-presenter in the room. A face to face patient evaluation with one of my colleagues will occur once the patient is placed in an emergency department room.      DISCLAIMER: This note was prepared with [employer] voice recognition transcription software. Garbled syntax, mangled pronouns, and other bizarre constructions may be attributed to that software system.

## 2023-12-12 ENCOUNTER — OFFICE VISIT (OUTPATIENT)
Dept: FAMILY MEDICINE | Facility: CLINIC | Age: 58
End: 2023-12-12
Attending: FAMILY MEDICINE
Payer: OTHER GOVERNMENT

## 2023-12-12 VITALS
HEART RATE: 67 BPM | WEIGHT: 232 LBS | BODY MASS INDEX: 36.41 KG/M2 | HEIGHT: 67 IN | DIASTOLIC BLOOD PRESSURE: 108 MMHG | SYSTOLIC BLOOD PRESSURE: 180 MMHG | TEMPERATURE: 99 F

## 2023-12-12 DIAGNOSIS — Z90.5 ACQUIRED ABSENCE OF KIDNEY: ICD-10-CM

## 2023-12-12 DIAGNOSIS — J30.2 SEASONAL ALLERGIC RHINITIS, UNSPECIFIED TRIGGER: ICD-10-CM

## 2023-12-12 DIAGNOSIS — E55.9 VITAMIN D DEFICIENCY: ICD-10-CM

## 2023-12-12 DIAGNOSIS — R05.8 POST-VIRAL COUGH SYNDROME: ICD-10-CM

## 2023-12-12 DIAGNOSIS — M17.0 PRIMARY OSTEOARTHRITIS OF BOTH KNEES: ICD-10-CM

## 2023-12-12 DIAGNOSIS — G43.009 MIGRAINE WITHOUT AURA AND WITHOUT STATUS MIGRAINOSUS, NOT INTRACTABLE: Primary | ICD-10-CM

## 2023-12-12 DIAGNOSIS — R73.03 PREDIABETES: ICD-10-CM

## 2023-12-12 DIAGNOSIS — I10 PRIMARY HYPERTENSION: ICD-10-CM

## 2023-12-12 DIAGNOSIS — J01.30 ACUTE NON-RECURRENT SPHENOIDAL SINUSITIS: ICD-10-CM

## 2023-12-12 DIAGNOSIS — R53.83 FATIGUE, UNSPECIFIED TYPE: ICD-10-CM

## 2023-12-12 PROCEDURE — 99214 OFFICE O/P EST MOD 30 MIN: CPT | Mod: 25,S$GLB,, | Performed by: FAMILY MEDICINE

## 2023-12-12 RX ORDER — VALSARTAN AND HYDROCHLOROTHIAZIDE 160; 12.5 MG/1; MG/1
1 TABLET, FILM COATED ORAL DAILY
Qty: 90 TABLET | Refills: 3 | Status: SHIPPED | OUTPATIENT
Start: 2023-12-12 | End: 2024-10-23 | Stop reason: SDUPTHER

## 2023-12-12 RX ORDER — AZITHROMYCIN 250 MG/1
TABLET, FILM COATED ORAL
Qty: 6 TABLET | Refills: 0 | Status: SHIPPED | OUTPATIENT
Start: 2023-12-12 | End: 2023-12-17

## 2023-12-12 RX ORDER — ERGOCALCIFEROL 1.25 MG/1
50000 CAPSULE ORAL
Qty: 12 CAPSULE | Refills: 3 | Status: SHIPPED | OUTPATIENT
Start: 2023-12-12

## 2023-12-12 RX ORDER — FLUTICASONE PROPIONATE 50 MCG
1 SPRAY, SUSPENSION (ML) NASAL DAILY
Qty: 16 G | Refills: 0 | Status: SHIPPED | OUTPATIENT
Start: 2023-12-12 | End: 2024-04-23

## 2023-12-12 NOTE — PROGRESS NOTES
SUBJECTIVE:    Patient ID: Sloane Dimas is a 59 y.o. female.    Chief Complaint: Cough / no energy / fatigue / dizziness (-dx with flu prior to thanksgiving/-coughing causes emesis), c/o elevated blood pressure, and c/o Headache 4-5x weekly    Patient hypertension in for visit.  Blood pressure is 150s at home.  Complains of headache.  She has a long history of migraines.  She has headaches most days of the week.  Usual medications are helping.  She has been having body aches and fatigue    Recently diagnosed with the flu reports cough and postnasal drip  that has continued since.  Chest x-ray done at urgent Care that was negative        Past Medical History:   Diagnosis Date    Anemia     Blood transfusion     Migraine     Positive PPD      DX- CXR OK- STARTED INH     Wears glasses     CONTACS     Past Surgical History:   Procedure Laterality Date    BREAST LUMPECTOMY      x 6 all benign     SECTION, CLASSIC      x 2    HEMORRHOID SURGERY      HYSTERECTOMY      2012    KIDNEY SURGERY      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      TUBAL LIGATION       Family History   Problem Relation Name Age of Onset    Lung cancer Mother      Breast cancer Mother      Hypertension Mother      Breast cancer Maternal Aunt      Cancer Maternal Aunt          breast triple negative    Kidney disease Brother         Marital Status:   Alcohol History:  reports current alcohol use.  Tobacco History:  reports that she has never smoked. She has never used smokeless tobacco.  Drug History:  reports no history of drug use.    Review of patient's allergies indicates:   Allergen Reactions    Other      NARCOTICS MAKE ITCH, RESP DEPRESSION, EMOTIONAL       Current Outpatient Medications:     ergocalciferol (ERGOCALCIFEROL) 50,000 unit Cap, Take 1 capsule (50,000 Units total) by mouth every 7 days., Disp: 12 capsule, Rfl: 3    valsartan-hydrochlorothiazide (DIOVAN-HCT) 160-12.5 mg per tablet, Take 1  "tablet by mouth once daily., Disp: 90 tablet, Rfl: 3  No current facility-administered medications for this visit.    Review of Systems   Constitutional:  Negative for activity change, fatigue and unexpected weight change.   HENT:  Positive for postnasal drip. Negative for hearing loss, sinus pressure, sore throat and voice change.    Eyes:  Negative for photophobia and visual disturbance.   Respiratory:  Positive for cough. Negative for shortness of breath and wheezing.    Cardiovascular:  Negative for chest pain and palpitations.   Gastrointestinal:  Negative for constipation, diarrhea and nausea.   Genitourinary:  Negative for difficulty urinating, frequency, hematuria and urgency.   Musculoskeletal:  Negative for arthralgias and back pain.   Skin:  Negative for rash.   Neurological:  Positive for headaches. Negative for weakness and light-headedness.   Hematological:  Negative for adenopathy. Does not bruise/bleed easily.   Psychiatric/Behavioral:  The patient is not nervous/anxious.           Objective:      Vitals:    12/12/23 1205   BP: (!) 180/108   Pulse: 67   Temp: 98.5 °F (36.9 °C)   Weight: 105.2 kg (232 lb)   Height: 5' 7" (1.702 m)     Physical Exam  Constitutional:       General: She is in acute distress.      Appearance: Normal appearance.   HENT:      Head: Normocephalic and atraumatic.      Nose: Congestion present.      Mouth/Throat:      Mouth: Mucous membranes are moist.   Eyes:      Conjunctiva/sclera: Conjunctivae normal.   Pulmonary:      Effort: Pulmonary effort is normal.   Neurological:      General: No focal deficit present.      Mental Status: She is alert and oriented to person, place, and time.   Psychiatric:         Mood and Affect: Mood normal.         Behavior: Behavior normal.           Assessment:       1. Migraine without aura and without status migrainosus, not intractable    2. Primary osteoarthritis of both knees    3. Prediabetes    4. Post-viral cough syndrome    5. Acute " non-recurrent sphenoidal sinusitis    6. Primary hypertension    7. Acquired absence of kidney    8. Vitamin D deficiency    9. Fatigue, unspecified type    10. Seasonal allergic rhinitis, unspecified trigger         Plan:       Migraine without aura and without status migrainosus, not intractable    Primary osteoarthritis of both knees    Prediabetes  -     Hemoglobin A1C; Future; Expected date: 12/12/2023    Post-viral cough syndrome    Acute non-recurrent sphenoidal sinusitis  -     azithromycin (Z-FELICIANO) 250 MG tablet; Take 2 tablets by mouth on day 1; Take 1 tablet by mouth on days 2-5  Dispense: 6 tablet; Refill: 0    Primary hypertension  -     TSH w/reflex to FT4; Future; Expected date: 12/12/2023  -     CBC Auto Differential; Future; Expected date: 12/12/2023  -      valsartan-hydrochlorothiazide (DIOVAN-HCT) 160-12.5 mg per tablet; Take 1 tablet by mouth once daily.  Dispense: 90 tablet; Refill: 3    Acquired absence of kidney  -     Microalbumin/Creatinine Ratio, Urine; Future; Expected date: 12/12/2023  -     Comprehensive Metabolic Panel; Future; Expected date: 12/12/2023  -     CBC Auto Differential; Future; Expected date: 12/12/2023    Vitamin D deficiency  -     Vitamin D; Future; Expected date: 12/12/2023  -     ergocalciferol (ERGOCALCIFEROL) 50,000 unit Cap; Take 1 capsule (50,000 Units total) by mouth every 7 days.  Dispense: 12 capsule; Refill: 3    Fatigue, unspecified type  -     Iron and TIBC; Future; Expected date: 12/12/2023    Seasonal allergic rhinitis, unspecified trigger  -     fluticasone propionate (FLONASE) 50 mcg/actuation nasal spray; 1 spray (50 mcg total) by Each Nostril route once daily. (Pat Dispense: 16 g; Refill: 0      Follow up in about 4 months (around 4/12/2024) for HTN.

## 2023-12-13 LAB
25(OH)D3 SERPL-MCNC: 30 NG/ML (ref 30–100)
ALBUMIN SERPL-MCNC: 4 G/DL (ref 3.6–5.1)
ALBUMIN/CREAT UR: 4 MCG/MG CREAT
ALBUMIN/GLOB SERPL: 1.3 (CALC) (ref 1–2.5)
ALP SERPL-CCNC: 97 U/L (ref 37–153)
ALT SERPL-CCNC: 13 U/L (ref 6–29)
AST SERPL-CCNC: 15 U/L (ref 10–35)
BASOPHILS # BLD AUTO: 38 CELLS/UL (ref 0–200)
BASOPHILS NFR BLD AUTO: 0.5 %
BILIRUB SERPL-MCNC: 0.3 MG/DL (ref 0.2–1.2)
BUN SERPL-MCNC: 8 MG/DL (ref 7–25)
BUN/CREAT SERPL: NORMAL (CALC) (ref 6–22)
CALCIUM SERPL-MCNC: 9.1 MG/DL (ref 8.6–10.4)
CHLORIDE SERPL-SCNC: 103 MMOL/L (ref 98–110)
CO2 SERPL-SCNC: 27 MMOL/L (ref 20–32)
CREAT SERPL-MCNC: 0.97 MG/DL (ref 0.5–1.03)
CREAT UR-MCNC: 240 MG/DL (ref 20–275)
EGFR: 68 ML/MIN/1.73M2
EOSINOPHIL # BLD AUTO: 60 CELLS/UL (ref 15–500)
EOSINOPHIL NFR BLD AUTO: 0.8 %
ERYTHROCYTE [DISTWIDTH] IN BLOOD BY AUTOMATED COUNT: 13.1 % (ref 11–15)
GLOBULIN SER CALC-MCNC: 3.2 G/DL (CALC) (ref 1.9–3.7)
GLUCOSE SERPL-MCNC: 92 MG/DL (ref 65–99)
HBA1C MFR BLD: 6.8 % OF TOTAL HGB
HCT VFR BLD AUTO: 39.2 % (ref 35–45)
HGB BLD-MCNC: 12.8 G/DL (ref 11.7–15.5)
IRON SATN MFR SERPL: 29 % (CALC) (ref 16–45)
IRON SERPL-MCNC: 78 MCG/DL (ref 45–160)
LYMPHOCYTES # BLD AUTO: 3413 CELLS/UL (ref 850–3900)
LYMPHOCYTES NFR BLD AUTO: 45.5 %
MCH RBC QN AUTO: 28.5 PG (ref 27–33)
MCHC RBC AUTO-ENTMCNC: 32.7 G/DL (ref 32–36)
MCV RBC AUTO: 87.3 FL (ref 80–100)
MICROALBUMIN UR-MCNC: 1 MG/DL
MONOCYTES # BLD AUTO: 338 CELLS/UL (ref 200–950)
MONOCYTES NFR BLD AUTO: 4.5 %
NEUTROPHILS # BLD AUTO: 3653 CELLS/UL (ref 1500–7800)
NEUTROPHILS NFR BLD AUTO: 48.7 %
PLATELET # BLD AUTO: 353 THOUSAND/UL (ref 140–400)
PMV BLD REES-ECKER: 10.1 FL (ref 7.5–12.5)
POTASSIUM SERPL-SCNC: 4 MMOL/L (ref 3.5–5.3)
PROT SERPL-MCNC: 7.2 G/DL (ref 6.1–8.1)
RBC # BLD AUTO: 4.49 MILLION/UL (ref 3.8–5.1)
SODIUM SERPL-SCNC: 140 MMOL/L (ref 135–146)
TIBC SERPL-MCNC: 268 MCG/DL (CALC) (ref 250–450)
TSH SERPL-ACNC: 1.44 MIU/L (ref 0.4–4.5)
WBC # BLD AUTO: 7.5 THOUSAND/UL (ref 3.8–10.8)

## 2023-12-21 ENCOUNTER — PATIENT MESSAGE (OUTPATIENT)
Dept: FAMILY MEDICINE | Facility: CLINIC | Age: 58
End: 2023-12-21
Payer: OTHER GOVERNMENT

## 2023-12-21 ENCOUNTER — CLINICAL SUPPORT (OUTPATIENT)
Dept: FAMILY MEDICINE | Facility: CLINIC | Age: 58
End: 2023-12-21
Payer: OTHER GOVERNMENT

## 2023-12-21 VITALS — HEART RATE: 92 BPM | DIASTOLIC BLOOD PRESSURE: 84 MMHG | SYSTOLIC BLOOD PRESSURE: 112 MMHG

## 2023-12-21 DIAGNOSIS — I10 PRIMARY HYPERTENSION: Primary | ICD-10-CM

## 2023-12-21 NOTE — PROGRESS NOTES
Pt here for NV Blood pressure check. Pt did not bring home cuff, but did bring logs. Scanned to chart. Pt states since starting the medication she has not had dizziness or HA.     Pt BP today good. Home reading elevated. Per Dr. Justice lets do NV next week keep logs BID and bring home cuff. Pt voiced understanding.

## 2023-12-29 ENCOUNTER — PATIENT MESSAGE (OUTPATIENT)
Dept: FAMILY MEDICINE | Facility: CLINIC | Age: 58
End: 2023-12-29
Payer: OTHER GOVERNMENT

## 2024-03-12 ENCOUNTER — PATIENT MESSAGE (OUTPATIENT)
Dept: ADMINISTRATIVE | Facility: HOSPITAL | Age: 59
End: 2024-03-12
Payer: OTHER GOVERNMENT

## 2024-03-14 DIAGNOSIS — Z12.31 OTHER SCREENING MAMMOGRAM: ICD-10-CM

## 2024-03-18 ENCOUNTER — PATIENT MESSAGE (OUTPATIENT)
Dept: ADMINISTRATIVE | Facility: HOSPITAL | Age: 59
End: 2024-03-18
Payer: OTHER GOVERNMENT

## 2024-03-18 ENCOUNTER — PATIENT OUTREACH (OUTPATIENT)
Dept: ADMINISTRATIVE | Facility: HOSPITAL | Age: 59
End: 2024-03-18
Payer: OTHER GOVERNMENT

## 2024-03-18 NOTE — PROGRESS NOTES
Population Health Chart Review & Patient Outreach Details      Additional [facility] Health Notes:      BREAST CANCER SCREENING    Non-compliant report chart audits for BREAST CANCER SCREENING     Outreach to patient in reference to SCHEDULING A MAMMOGRAM EXAM.            Updates Requested / Reviewed:        Health Maintenance Topics Overdue:      VB Score: 2     Colon Cancer Screening  Mammogram                       Health Maintenance Topic(s) Outreach Outcomes & Actions Taken:    Breast Cancer Screening - Outreach Outcomes & Actions Taken  : Mammogram Order Placed

## 2024-04-23 ENCOUNTER — OFFICE VISIT (OUTPATIENT)
Dept: FAMILY MEDICINE | Facility: CLINIC | Age: 59
End: 2024-04-23
Payer: OTHER GOVERNMENT

## 2024-04-23 ENCOUNTER — TELEPHONE (OUTPATIENT)
Dept: ORTHOPEDICS | Facility: CLINIC | Age: 59
End: 2024-04-23
Payer: OTHER GOVERNMENT

## 2024-04-23 VITALS
HEART RATE: 92 BPM | HEIGHT: 67 IN | WEIGHT: 228 LBS | OXYGEN SATURATION: 99 % | DIASTOLIC BLOOD PRESSURE: 88 MMHG | BODY MASS INDEX: 35.79 KG/M2 | SYSTOLIC BLOOD PRESSURE: 118 MMHG

## 2024-04-23 DIAGNOSIS — R73.03 PREDIABETES: ICD-10-CM

## 2024-04-23 DIAGNOSIS — M17.11 ARTHRITIS OF RIGHT KNEE: ICD-10-CM

## 2024-04-23 DIAGNOSIS — M17.12 PRIMARY OSTEOARTHRITIS OF LEFT KNEE: ICD-10-CM

## 2024-04-23 DIAGNOSIS — H35.9 RETINA DISORDER: ICD-10-CM

## 2024-04-23 DIAGNOSIS — Z12.11 COLON CANCER SCREENING: ICD-10-CM

## 2024-04-23 DIAGNOSIS — I10 PRIMARY HYPERTENSION: Primary | ICD-10-CM

## 2024-04-23 DIAGNOSIS — L60.0 INGROWING TOENAIL: ICD-10-CM

## 2024-04-23 PROCEDURE — 99214 OFFICE O/P EST MOD 30 MIN: CPT | Mod: S$GLB,,, | Performed by: FAMILY MEDICINE

## 2024-06-25 DIAGNOSIS — M25.569 KNEE PAIN, UNSPECIFIED CHRONICITY, UNSPECIFIED LATERALITY: Primary | ICD-10-CM

## 2024-06-28 ENCOUNTER — HOSPITAL ENCOUNTER (OUTPATIENT)
Dept: RADIOLOGY | Facility: HOSPITAL | Age: 59
Discharge: HOME | End: 2024-06-28
Attending: FAMILY MEDICINE
Payer: OTHER GOVERNMENT

## 2024-06-28 VITALS — BODY MASS INDEX: 35.79 KG/M2 | HEIGHT: 67 IN | WEIGHT: 228 LBS

## 2024-06-28 DIAGNOSIS — Z12.31 OTHER SCREENING MAMMOGRAM: ICD-10-CM

## 2024-06-28 PROCEDURE — 77067 SCR MAMMO BI INCL CAD: CPT | Mod: 26,,, | Performed by: RADIOLOGY

## 2024-06-28 PROCEDURE — 77067 SCR MAMMO BI INCL CAD: CPT | Mod: TC,PO

## 2024-06-28 PROCEDURE — 77063 BREAST TOMOSYNTHESIS BI: CPT | Mod: 26,,, | Performed by: RADIOLOGY

## 2024-07-01 ENCOUNTER — HOSPITAL ENCOUNTER (OUTPATIENT)
Dept: RADIOLOGY | Facility: HOSPITAL | Age: 59
Discharge: HOME | End: 2024-07-01
Attending: ORTHOPAEDIC SURGERY
Payer: OTHER GOVERNMENT

## 2024-07-01 ENCOUNTER — OFFICE VISIT (OUTPATIENT)
Dept: ORTHOPEDICS | Facility: CLINIC | Age: 59
End: 2024-07-01
Payer: OTHER GOVERNMENT

## 2024-07-01 VITALS — WEIGHT: 227.94 LBS | HEIGHT: 67 IN | BODY MASS INDEX: 35.78 KG/M2 | RESPIRATION RATE: 18 BRPM

## 2024-07-01 DIAGNOSIS — M25.569 KNEE PAIN, UNSPECIFIED CHRONICITY, UNSPECIFIED LATERALITY: ICD-10-CM

## 2024-07-01 DIAGNOSIS — M17.0 BILATERAL PRIMARY OSTEOARTHRITIS OF KNEE: Primary | ICD-10-CM

## 2024-07-01 DIAGNOSIS — M25.551 HIP PAIN, RIGHT: ICD-10-CM

## 2024-07-01 PROCEDURE — 73564 X-RAY EXAM KNEE 4 OR MORE: CPT | Mod: TC,50,PO

## 2024-07-01 PROCEDURE — 99999 PR PBB SHADOW E&M-EST. PATIENT-LVL III: CPT | Mod: PBBFAC,,, | Performed by: ORTHOPAEDIC SURGERY

## 2024-07-01 PROCEDURE — 99213 OFFICE O/P EST LOW 20 MIN: CPT | Mod: PBBFAC,25,PO | Performed by: ORTHOPAEDIC SURGERY

## 2024-07-01 PROCEDURE — 73564 X-RAY EXAM KNEE 4 OR MORE: CPT | Mod: 26,50,, | Performed by: RADIOLOGY

## 2024-07-01 PROCEDURE — 99214 OFFICE O/P EST MOD 30 MIN: CPT | Mod: S$PBB,,, | Performed by: ORTHOPAEDIC SURGERY

## 2024-07-01 NOTE — PROGRESS NOTES
Past Medical History:   Diagnosis Date    Anemia     Blood transfusion     Migraine     Positive PPD      DX- CXR OK- STARTED INH     Wears glasses     CONTACS       Past Surgical History:   Procedure Laterality Date    BREAST LUMPECTOMY      x 6 all benign     SECTION, CLASSIC      x 2    HEMORRHOID SURGERY      HYSTERECTOMY      2012    KIDNEY SURGERY      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  2006    TUBAL LIGATION         Current Outpatient Medications   Medication Sig    ergocalciferol (ERGOCALCIFEROL) 50,000 unit Cap Take 1 capsule (50,000 Units total) by mouth every 7 days.    valsartan-hydrochlorothiazide (DIOVAN-HCT) 160-12.5 mg per tablet Take 1 tablet by mouth once daily.     No current facility-administered medications for this visit.       Allergies   Allergen Reactions    Other      NARCOTICS MAKE ITCH, RESP DEPRESSION, EMOTIONAL       Family History   Problem Relation Name Age of Onset    Lung cancer Mother      Breast cancer Mother      Hypertension Mother      Breast cancer Maternal Aunt      Cancer Maternal Aunt          breast triple negative    Kidney disease Brother         Social History     Socioeconomic History    Marital status:    Tobacco Use    Smoking status: Never    Smokeless tobacco: Never   Substance and Sexual Activity    Alcohol use: Yes     Comment: VERY RARELY    Drug use: No    Sexual activity: Not Currently     Partners: Male       Chief Complaint:   Chief Complaint   Patient presents with    Follow-up     Follow up B knee pain        History of present illness: This is a 59-year-old female who is seen for bilateral knee pain.  Patient has had treatment years ago including viscosupplementation and cortisone.  She did great with the Synvisc series.  Cortisone never really helped her much.  Pain is 6/10.  Left knee can be up to an 8/10.  They feel unstable will buckle on her at times.        Answers submitted by the patient for this visit:  Orthopedics  Questionnaire (Submitted on 3/5/2023)  unexpected weight change: No  appetite change : No  sleep disturbance: No  IMMUNOCOMPROMISED: No  nervous/ anxious: No  dysphoric mood: No  rash: No  visual disturbance: No  eye redness: No  eye pain: No  ear pain: No  tinnitus: No  hearing loss: No  sinus pressure : No  nosebleeds: No  enviro allergies: Yes  food allergies: No  cough: No  shortness of breath: No  sweating: No  dysuria: No  frequency: No  difficulty urinating: No  hematuria: No  painful intercourse: No  chest pain: No  palpitations: No  nausea: No  vomiting: No  diarrhea: No  blood in stool: No  constipation: No  headaches: No  dizziness: No  numbness: No  seizures: No  joint swelling: Yes  myalgia: No  weakness: No  back pain: No   (Submitted on 3/5/2023)  Chief Complaint: Leg pain  Pain Chronicity: chronic  History of trauma: No  Onset: more than 1 year ago  Frequency: daily  Progression since onset: gradually worsening  injury location: at home  pain- numeric: 6/10  pain location: left knee, right knee, left ankle, left foot, right foot  pain quality: aching, sharp, tightness, shooting, throbbing  Radiating Pain: Yes  If your pain is radiating, to what part of the body?: left knee, right knee  Aggravating factors: activity, bending, bearing weight, exercise, extension, standing, flexion, twisting, walking  fever: No  inability to bear weight: Yes  itching: No  joint locking: Yes  limited range of motion: Yes  stiffness: Yes  tingling: No  Treatments tried: brace/corset, exercise, injection treatment, movement, NSAIDs, OTC ointments, rest  physical therapy: not tried  Improvement on treatment: no relief      Physical Examination:    Vital Signs:    Vitals:    07/01/24 0847   Resp: 18         Body mass index is 35.7 kg/m².    This a well-developed, well nourished patient in no acute distress.  They are alert and oriented and cooperative to examination.  Pt. walks without an antalgic gait.      Examination of  bilateral knees shows no rashes or erythema. There are no masses ecchymosis or effusion. Patient has full range of motion from 0-130°. Patient is nontender to palpation over lateral joint line and mildly tender to palpation over the medial joint line. Knee is stable to varus and valgus stress. 5 out of 5 motor strength. Palpable distal pulses. Intact light touch sensation.  Mild Patellofemoral crepitus    Examination of the patient's right hip shows full range of motion with flexion to 160°, extension to 0, external rotation to 50°, internal rotation of 15°, abduction of 50°, adduction of 15°. Skin has no rashes or bruising. Patient has negative Stinchfield exam. Patient has negative straight leg raise.Negative internal impingement test. Negative LISA test. Negative Jocelynn's test. Patient has minimally positive pain with hip range of motion. Nontender to palpation over the greater trochanteric bursa. Patient is 5 out of 5 motor strength, palpable distal pulses, and intact light touch sensation.       X-rays: X-ray of both knees are ordered and  reviewed which show mild to moderate degenerative changes.  Moderate medial narrowing.  No significant progression over the years.     Assessment:: Mild to moderate knee arthritis  Right hip pain      Plan:  I reviewed the findings with her today.  We talked about treatment options.  I we will get another approval for Synvisc series of both knees.  We will also get an x-ray of the right hip at her 1st visit.    The patient has tried a self guided exercise program that has included walking without significant improvement. Minimal relief with tylenol or OTC Nsaids. Reports less than 8 weeks relief with IA steroid injection. Kellgren Miguel scale of 3. They are not receiving another HA injectable at this time. I will precert for gel injection.

## 2024-07-03 ENCOUNTER — HOSPITAL ENCOUNTER (OUTPATIENT)
Dept: RADIOLOGY | Facility: HOSPITAL | Age: 59
Discharge: HOME | End: 2024-07-03
Attending: FAMILY MEDICINE
Payer: OTHER GOVERNMENT

## 2024-07-03 DIAGNOSIS — R92.8 ABNORMAL MAMMOGRAM: ICD-10-CM

## 2024-07-03 PROCEDURE — 76642 ULTRASOUND BREAST LIMITED: CPT | Mod: TC,PO,LT

## 2024-07-03 PROCEDURE — 77065 DX MAMMO INCL CAD UNI: CPT | Mod: 26,LT,, | Performed by: RADIOLOGY

## 2024-07-03 PROCEDURE — 77061 BREAST TOMOSYNTHESIS UNI: CPT | Mod: 26,LT,, | Performed by: RADIOLOGY

## 2024-07-03 PROCEDURE — 76642 ULTRASOUND BREAST LIMITED: CPT | Mod: 26,LT,, | Performed by: RADIOLOGY

## 2024-07-03 PROCEDURE — 77061 BREAST TOMOSYNTHESIS UNI: CPT | Mod: TC,PO,LT

## 2024-07-12 DIAGNOSIS — M25.551 RIGHT HIP PAIN: Primary | ICD-10-CM

## 2024-08-19 ENCOUNTER — OFFICE VISIT (OUTPATIENT)
Dept: ORTHOPEDICS | Facility: CLINIC | Age: 59
End: 2024-08-19
Payer: OTHER GOVERNMENT

## 2024-08-19 ENCOUNTER — HOSPITAL ENCOUNTER (OUTPATIENT)
Dept: RADIOLOGY | Facility: HOSPITAL | Age: 59
Discharge: HOME | End: 2024-08-19
Attending: ORTHOPAEDIC SURGERY
Payer: OTHER GOVERNMENT

## 2024-08-19 VITALS — HEIGHT: 67 IN | WEIGHT: 227.94 LBS | BODY MASS INDEX: 35.78 KG/M2 | RESPIRATION RATE: 18 BRPM

## 2024-08-19 DIAGNOSIS — M25.551 RIGHT HIP PAIN: ICD-10-CM

## 2024-08-19 DIAGNOSIS — M17.0 BILATERAL PRIMARY OSTEOARTHRITIS OF KNEE: Primary | ICD-10-CM

## 2024-08-19 DIAGNOSIS — M25.551 HIP PAIN, RIGHT: ICD-10-CM

## 2024-08-19 PROCEDURE — 20610 DRAIN/INJ JOINT/BURSA W/O US: CPT | Mod: 50,PBBFAC,PO | Performed by: ORTHOPAEDIC SURGERY

## 2024-08-19 PROCEDURE — 99999 PR PBB SHADOW E&M-EST. PATIENT-LVL III: CPT | Mod: PBBFAC,,, | Performed by: ORTHOPAEDIC SURGERY

## 2024-08-19 PROCEDURE — 99213 OFFICE O/P EST LOW 20 MIN: CPT | Mod: PBBFAC,25,PO | Performed by: ORTHOPAEDIC SURGERY

## 2024-08-19 PROCEDURE — 73502 X-RAY EXAM HIP UNI 2-3 VIEWS: CPT | Mod: 26,RT,, | Performed by: RADIOLOGY

## 2024-08-19 PROCEDURE — 99214 OFFICE O/P EST MOD 30 MIN: CPT | Mod: 25,S$PBB,, | Performed by: ORTHOPAEDIC SURGERY

## 2024-08-19 PROCEDURE — 73502 X-RAY EXAM HIP UNI 2-3 VIEWS: CPT | Mod: TC,PO,RT

## 2024-08-19 PROCEDURE — 99999PBSHW PR PBB SHADOW TECHNICAL ONLY FILED TO HB: Mod: JZ,PBBFAC,,

## 2024-08-19 RX ADMIN — Medication 16 MG: at 10:08

## 2024-08-19 NOTE — PROCEDURES
Large Joint Aspiration/Injection: bilateral knee    Date/Time: 8/19/2024 10:15 AM    Performed by: Celso Cortes MD  Authorized by: Celso Cortes MD    Consent Done?:  Yes (Verbal)  Indications:  Pain  Site marked: the procedure site was marked    Timeout: prior to procedure the correct patient, procedure, and site was verified      Details:  Needle Size:  20 G  Approach:  Anterolateral  Location:  Knee  Laterality:  Bilateral  Site:  Bilateral knee  Medications (Right):  16 mg hyaluronate 16 mg/2 mL  Medications (Left):  16 mg hyaluronate 16 mg/2 mL  Patient tolerance:  Patient tolerated the procedure well with no immediate complications

## 2024-08-19 NOTE — PROGRESS NOTES
Past Medical History:   Diagnosis Date    Anemia     Blood transfusion     Migraine     Positive PPD      DX- CXR OK- STARTED INH     Wears glasses     CONTACS       Past Surgical History:   Procedure Laterality Date    BREAST LUMPECTOMY      x 6 all benign     SECTION, CLASSIC      x 2    HEMORRHOID SURGERY      HYSTERECTOMY      2012    KIDNEY SURGERY      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  2006    TUBAL LIGATION         Current Outpatient Medications   Medication Sig    ergocalciferol (ERGOCALCIFEROL) 50,000 unit Cap Take 1 capsule (50,000 Units total) by mouth every 7 days.    valsartan-hydrochlorothiazide (DIOVAN-HCT) 160-12.5 mg per tablet Take 1 tablet by mouth once daily.     No current facility-administered medications for this visit.       Allergies   Allergen Reactions    Other      NARCOTICS MAKE ITCH, RESP DEPRESSION, EMOTIONAL       Family History   Problem Relation Name Age of Onset    Lung cancer Mother      Breast cancer Mother      Hypertension Mother      Breast cancer Maternal Aunt      Cancer Maternal Aunt          breast triple negative    Kidney disease Brother         Social History     Socioeconomic History    Marital status:    Tobacco Use    Smoking status: Never    Smokeless tobacco: Never   Substance and Sexual Activity    Alcohol use: Yes     Comment: VERY RARELY    Drug use: No    Sexual activity: Not Currently     Partners: Male       Chief Complaint:   No chief complaint on file.      History of present illness: This is a 59-year-old female who is seen for bilateral knee pain.  Patient has had treatment years ago including viscosupplementation and cortisone.  She did great with the Synvisc series.  Cortisone never really helped her much.  Pain is 6/10.  Left knee can be up to an 8/10.  They feel unstable will buckle on her at times.        Answers submitted by the patient for this visit:  Orthopedics Questionnaire (Submitted on 3/5/2023)  unexpected weight  change: No  appetite change : No  sleep disturbance: No  IMMUNOCOMPROMISED: No  nervous/ anxious: No  dysphoric mood: No  rash: No  visual disturbance: No  eye redness: No  eye pain: No  ear pain: No  tinnitus: No  hearing loss: No  sinus pressure : No  nosebleeds: No  enviro allergies: Yes  food allergies: No  cough: No  shortness of breath: No  sweating: No  dysuria: No  frequency: No  difficulty urinating: No  hematuria: No  painful intercourse: No  chest pain: No  palpitations: No  nausea: No  vomiting: No  diarrhea: No  blood in stool: No  constipation: No  headaches: No  dizziness: No  numbness: No  seizures: No  joint swelling: Yes  myalgia: No  weakness: No  back pain: No   (Submitted on 3/5/2023)  Chief Complaint: Leg pain  Pain Chronicity: chronic  History of trauma: No  Onset: more than 1 year ago  Frequency: daily  Progression since onset: gradually worsening  injury location: at home  pain- numeric: 6/10  pain location: left knee, right knee, left ankle, left foot, right foot  pain quality: aching, sharp, tightness, shooting, throbbing  Radiating Pain: Yes  If your pain is radiating, to what part of the body?: left knee, right knee  Aggravating factors: activity, bending, bearing weight, exercise, extension, standing, flexion, twisting, walking  fever: No  inability to bear weight: Yes  itching: No  joint locking: Yes  limited range of motion: Yes  stiffness: Yes  tingling: No  Treatments tried: brace/corset, exercise, injection treatment, movement, NSAIDs, OTC ointments, rest  physical therapy: not tried  Improvement on treatment: no relief      Physical Examination:    Vital Signs:    There were no vitals filed for this visit.        There is no height or weight on file to calculate BMI.    This a well-developed, well nourished patient in no acute distress.  They are alert and oriented and cooperative to examination.  Pt. walks without an antalgic gait.      Examination of bilateral knees shows no  rashes or erythema. There are no masses ecchymosis or effusion. Patient has full range of motion from 0-130°. Patient is nontender to palpation over lateral joint line and mildly tender to palpation over the medial joint line. Knee is stable to varus and valgus stress. 5 out of 5 motor strength. Palpable distal pulses. Intact light touch sensation.  Mild Patellofemoral crepitus    Examination of the patient's right hip shows full range of motion with flexion to 160°, extension to 0, external rotation to 50°, internal rotation of 15°, abduction of 50°, adduction of 15°. Skin has no rashes or bruising. Patient has negative Stinchfield exam. Patient has negative straight leg raise.Negative internal impingement test. Negative LISA test. Negative Jocelynn's test. Patient has minimally positive pain with hip range of motion. Nontender to palpation over the greater trochanteric bursa. Patient is 5 out of 5 motor strength, palpable distal pulses, and intact light touch sensation.       X-rays: X-ray of both knees are  reviewed which show mild to moderate degenerative changes.  Moderate medial narrowing.  No significant progression over the years.  X-rays of the right hip are ordered and review which show some mild hip osteoarthritis     Assessment:: Mild to moderate knee arthritis  Right hip pain      Plan:  I reviewed the findings with her today.  I injected both knees with Synvisc 1 of 3 today.  Follow up next week.    The patient has tried a self guided exercise program that has included walking without significant improvement. Minimal relief with tylenol or OTC Nsaids. Reports less than 8 weeks relief with IA steroid injection. Kellgren Miguel scale of 3. They are not receiving another HA injectable at this time. I will precert for gel injection.

## 2024-08-26 ENCOUNTER — OFFICE VISIT (OUTPATIENT)
Dept: ORTHOPEDICS | Facility: CLINIC | Age: 59
End: 2024-08-26
Payer: OTHER GOVERNMENT

## 2024-08-26 ENCOUNTER — HOSPITAL ENCOUNTER (OUTPATIENT)
Dept: RADIOLOGY | Facility: HOSPITAL | Age: 59
Discharge: HOME | End: 2024-08-26
Attending: ORTHOPAEDIC SURGERY
Payer: OTHER GOVERNMENT

## 2024-08-26 VITALS — HEIGHT: 67 IN | RESPIRATION RATE: 18 BRPM | WEIGHT: 227.94 LBS | BODY MASS INDEX: 35.78 KG/M2

## 2024-08-26 DIAGNOSIS — M79.642 LEFT HAND PAIN: ICD-10-CM

## 2024-08-26 DIAGNOSIS — M17.0 BILATERAL PRIMARY OSTEOARTHRITIS OF KNEE: Primary | ICD-10-CM

## 2024-08-26 DIAGNOSIS — S62.367A CLOSED NONDISPLACED FRACTURE OF NECK OF FIFTH METACARPAL BONE OF LEFT HAND, INITIAL ENCOUNTER: ICD-10-CM

## 2024-08-26 DIAGNOSIS — M79.642 LEFT HAND PAIN: Primary | ICD-10-CM

## 2024-08-26 PROCEDURE — 26600 TREAT METACARPAL FRACTURE: CPT | Mod: S$PBB,LT,, | Performed by: ORTHOPAEDIC SURGERY

## 2024-08-26 PROCEDURE — 99213 OFFICE O/P EST LOW 20 MIN: CPT | Mod: PBBFAC,25,PO | Performed by: ORTHOPAEDIC SURGERY

## 2024-08-26 PROCEDURE — 26600 TREAT METACARPAL FRACTURE: CPT | Mod: PBBFAC,PO | Performed by: ORTHOPAEDIC SURGERY

## 2024-08-26 PROCEDURE — 99999PBSHW PR PBB SHADOW TECHNICAL ONLY FILED TO HB: Mod: JZ,PBBFAC,,

## 2024-08-26 PROCEDURE — 73130 X-RAY EXAM OF HAND: CPT | Mod: 26,LT,, | Performed by: RADIOLOGY

## 2024-08-26 PROCEDURE — 99999 PR PBB SHADOW E&M-EST. PATIENT-LVL III: CPT | Mod: PBBFAC,,, | Performed by: ORTHOPAEDIC SURGERY

## 2024-08-26 PROCEDURE — 99214 OFFICE O/P EST MOD 30 MIN: CPT | Mod: 25,S$PBB,57, | Performed by: ORTHOPAEDIC SURGERY

## 2024-08-26 PROCEDURE — 20610 DRAIN/INJ JOINT/BURSA W/O US: CPT | Mod: 50,PBBFAC,PO | Performed by: ORTHOPAEDIC SURGERY

## 2024-08-26 PROCEDURE — 73130 X-RAY EXAM OF HAND: CPT | Mod: TC,PO,LT

## 2024-08-26 RX ADMIN — Medication 16 MG: at 08:08

## 2024-08-26 NOTE — PROGRESS NOTES
Past Medical History:   Diagnosis Date    Anemia     Blood transfusion     Migraine     Positive PPD      DX- CXR OK- STARTED INH     Wears glasses     CONTACS       Past Surgical History:   Procedure Laterality Date    BREAST LUMPECTOMY      x 6 all benign     SECTION, CLASSIC      x 2    HEMORRHOID SURGERY      HYSTERECTOMY      2012    KIDNEY SURGERY      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  2006    TUBAL LIGATION         Current Outpatient Medications   Medication Sig    ergocalciferol (ERGOCALCIFEROL) 50,000 unit Cap Take 1 capsule (50,000 Units total) by mouth every 7 days.    valsartan-hydrochlorothiazide (DIOVAN-HCT) 160-12.5 mg per tablet Take 1 tablet by mouth once daily.     No current facility-administered medications for this visit.       Allergies   Allergen Reactions    Other      NARCOTICS MAKE ITCH, RESP DEPRESSION, EMOTIONAL       Family History   Problem Relation Name Age of Onset    Lung cancer Mother      Breast cancer Mother      Hypertension Mother      Breast cancer Maternal Aunt      Cancer Maternal Aunt          breast triple negative    Kidney disease Brother         Social History     Socioeconomic History    Marital status:    Tobacco Use    Smoking status: Never    Smokeless tobacco: Never   Substance and Sexual Activity    Alcohol use: Yes     Comment: VERY RARELY    Drug use: No    Sexual activity: Not Currently     Partners: Male       Chief Complaint:   Chief Complaint   Patient presents with    Left Hand - Pain, Injury    Follow-up     B synvisc 2/3       History of present illness: This is a 59-year-old female who is seen for bilateral knee pain.  Patient has had treatment years ago including viscosupplementation and cortisone.  She did great with the Synvisc series.  Cortisone never really helped her much.  Patient unfortunately had a fall over the weekend onto an outstretched left hand.  Has a lot of pain and swelling.  Patient had a deformity of the left  small finger in her  actually pulled on it which corrected some of it.  Pain is a 5/10.        Answers submitted by the patient for this visit:  Orthopedics Questionnaire (Submitted on 3/5/2023)  unexpected weight change: No  appetite change : No  sleep disturbance: No  IMMUNOCOMPROMISED: No  nervous/ anxious: No  dysphoric mood: No  rash: No  visual disturbance: No  eye redness: No  eye pain: No  ear pain: No  tinnitus: No  hearing loss: No  sinus pressure : No  nosebleeds: No  enviro allergies: Yes  food allergies: No  cough: No  shortness of breath: No  sweating: No  dysuria: No  frequency: No  difficulty urinating: No  hematuria: No  painful intercourse: No  chest pain: No  palpitations: No  nausea: No  vomiting: No  diarrhea: No  blood in stool: No  constipation: No  headaches: No  dizziness: No  numbness: No  seizures: No  joint swelling: Yes  myalgia: No  weakness: No  back pain: No   (Submitted on 3/5/2023)  Chief Complaint: Leg pain  Pain Chronicity: chronic  History of trauma: No  Onset: more than 1 year ago  Frequency: daily  Progression since onset: gradually worsening  injury location: at home  pain- numeric: 6/10  pain location: left knee, right knee, left ankle, left foot, right foot  pain quality: aching, sharp, tightness, shooting, throbbing  Radiating Pain: Yes  If your pain is radiating, to what part of the body?: left knee, right knee  Aggravating factors: activity, bending, bearing weight, exercise, extension, standing, flexion, twisting, walking  fever: No  inability to bear weight: Yes  itching: No  joint locking: Yes  limited range of motion: Yes  stiffness: Yes  tingling: No  Treatments tried: brace/corset, exercise, injection treatment, movement, NSAIDs, OTC ointments, rest  physical therapy: not tried  Improvement on treatment: no relief      Physical Examination:    Vital Signs:    Vitals:    08/26/24 0848   Resp: 18           Body mass index is 35.7 kg/m².    This a  well-developed, well nourished patient in no acute distress.  They are alert and oriented and cooperative to examination.  Pt. walks without an antalgic gait.      Examination of bilateral knees shows no rashes or erythema. There are no masses ecchymosis or effusion. Patient has full range of motion from 0-130°. Patient is nontender to palpation over lateral joint line and mildly tender to palpation over the medial joint line. Knee is stable to varus and valgus stress. 5 out of 5 motor strength. Palpable distal pulses. Intact light touch sensation.  Mild Patellofemoral crepitus    Examination left hand shows swelling particularly ulnarly.  Tenderness over the 5th metacarpal.  Patient has decreased range of motion of the 4th and 5th digits due to pain.  Neurovascularly intact    X-rays: X-ray of both knees are  reviewed which show mild to moderate degenerative changes.  Moderate medial narrowing.  No significant progression over the years.  X-rays of the right hip are  review which show some mild hip osteoarthritis  X-rays left hand are ordered and review which show a 5th metacarpal neck fracture with acceptable alignment     Assessment:: Mild to moderate knee arthritis  Left 5th metacarpal neck fracture      Plan:  I reviewed the findings with her today.  I injected both knees with Synvisc 2 of 3 today.  Follow up next week.  Placed her in a ulnar gutter brace today.  We will continue to follow this.    The patient has tried a self guided exercise program that has included walking without significant improvement. Minimal relief with tylenol or OTC Nsaids. Reports less than 8 weeks relief with IA steroid injection. Kellgren Miguel scale of 3. They are not receiving another HA injectable at this time. I will precert for gel injection.

## 2024-08-26 NOTE — PROCEDURES
Large Joint Aspiration/Injection: bilateral knee    Date/Time: 8/26/2024 8:45 AM    Performed by: Celso Cortes MD  Authorized by: Celso Cortes MD    Consent Done?:  Yes (Verbal)  Indications:  Pain  Site marked: the procedure site was marked    Timeout: prior to procedure the correct patient, procedure, and site was verified      Details:  Needle Size:  20 G  Approach:  Anterolateral  Location:  Knee  Laterality:  Bilateral  Site:  Bilateral knee  Medications (Right):  16 mg hyaluronate 16 mg/2 mL  Medications (Left):  16 mg hyaluronate 16 mg/2 mL  Patient tolerance:  Patient tolerated the procedure well with no immediate complications

## 2024-09-05 ENCOUNTER — OFFICE VISIT (OUTPATIENT)
Dept: ORTHOPEDICS | Facility: CLINIC | Age: 59
End: 2024-09-05
Payer: OTHER GOVERNMENT

## 2024-09-05 VITALS — HEIGHT: 67 IN | WEIGHT: 227.94 LBS | BODY MASS INDEX: 35.78 KG/M2

## 2024-09-05 DIAGNOSIS — M17.0 BILATERAL PRIMARY OSTEOARTHRITIS OF KNEE: Primary | ICD-10-CM

## 2024-09-05 PROCEDURE — 99213 OFFICE O/P EST LOW 20 MIN: CPT | Mod: PBBFAC,PO | Performed by: ORTHOPAEDIC SURGERY

## 2024-09-05 PROCEDURE — 20610 DRAIN/INJ JOINT/BURSA W/O US: CPT | Mod: 50,PBBFAC,PO | Performed by: ORTHOPAEDIC SURGERY

## 2024-09-05 PROCEDURE — 99999 PR PBB SHADOW E&M-EST. PATIENT-LVL III: CPT | Mod: PBBFAC,,, | Performed by: ORTHOPAEDIC SURGERY

## 2024-09-05 PROCEDURE — 99999PBSHW PR PBB SHADOW TECHNICAL ONLY FILED TO HB: Mod: JZ,PBBFAC,,

## 2024-09-05 RX ADMIN — Medication 16 MG: at 08:09

## 2024-09-05 NOTE — PROGRESS NOTES
Past Medical History:   Diagnosis Date    Anemia     Blood transfusion     Migraine     Positive PPD      DX- CXR OK- STARTED INH     Wears glasses     CONTACS       Past Surgical History:   Procedure Laterality Date    BREAST LUMPECTOMY      x 6 all benign     SECTION, CLASSIC      x 2    HEMORRHOID SURGERY      HYSTERECTOMY      2012    KIDNEY SURGERY      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  2006    TUBAL LIGATION         Current Outpatient Medications   Medication Sig    ergocalciferol (ERGOCALCIFEROL) 50,000 unit Cap Take 1 capsule (50,000 Units total) by mouth every 7 days.    valsartan-hydrochlorothiazide (DIOVAN-HCT) 160-12.5 mg per tablet Take 1 tablet by mouth once daily.     No current facility-administered medications for this visit.       Allergies   Allergen Reactions    Other      NARCOTICS MAKE ITCH, RESP DEPRESSION, EMOTIONAL       Family History   Problem Relation Name Age of Onset    Lung cancer Mother      Breast cancer Mother      Hypertension Mother      Breast cancer Maternal Aunt      Cancer Maternal Aunt          breast triple negative    Kidney disease Brother         Social History     Socioeconomic History    Marital status:    Tobacco Use    Smoking status: Never    Smokeless tobacco: Never   Substance and Sexual Activity    Alcohol use: Yes     Comment: VERY RARELY    Drug use: No    Sexual activity: Not Currently     Partners: Male       Chief Complaint:   Chief Complaint   Patient presents with    Injections     B synvisc 3/3       History of present illness: This is a 59-year-old female who is seen for bilateral knee pain.  Patient has had treatment years ago including viscosupplementation and cortisone.  She did great with the Synvisc series.  Cortisone never really helped her much.  Patient unfortunately had a fall over the weekend onto an outstretched left hand.  Has a lot of pain and swelling.  Patient had a deformity of the left small finger in her   actually pulled on it which corrected some of it.  Pain is a 5/10.        Answers submitted by the patient for this visit:  Orthopedics Questionnaire (Submitted on 3/5/2023)  unexpected weight change: No  appetite change : No  sleep disturbance: No  IMMUNOCOMPROMISED: No  nervous/ anxious: No  dysphoric mood: No  rash: No  visual disturbance: No  eye redness: No  eye pain: No  ear pain: No  tinnitus: No  hearing loss: No  sinus pressure : No  nosebleeds: No  enviro allergies: Yes  food allergies: No  cough: No  shortness of breath: No  sweating: No  dysuria: No  frequency: No  difficulty urinating: No  hematuria: No  painful intercourse: No  chest pain: No  palpitations: No  nausea: No  vomiting: No  diarrhea: No  blood in stool: No  constipation: No  headaches: No  dizziness: No  numbness: No  seizures: No  joint swelling: Yes  myalgia: No  weakness: No  back pain: No   (Submitted on 3/5/2023)  Chief Complaint: Leg pain  Pain Chronicity: chronic  History of trauma: No  Onset: more than 1 year ago  Frequency: daily  Progression since onset: gradually worsening  injury location: at home  pain- numeric: 6/10  pain location: left knee, right knee, left ankle, left foot, right foot  pain quality: aching, sharp, tightness, shooting, throbbing  Radiating Pain: Yes  If your pain is radiating, to what part of the body?: left knee, right knee  Aggravating factors: activity, bending, bearing weight, exercise, extension, standing, flexion, twisting, walking  fever: No  inability to bear weight: Yes  itching: No  joint locking: Yes  limited range of motion: Yes  stiffness: Yes  tingling: No  Treatments tried: brace/corset, exercise, injection treatment, movement, NSAIDs, OTC ointments, rest  physical therapy: not tried  Improvement on treatment: no relief      Physical Examination:    Vital Signs:    There were no vitals filed for this visit.          Body mass index is 35.7 kg/m².    This a well-developed, well nourished  patient in no acute distress.  They are alert and oriented and cooperative to examination.  Pt. walks without an antalgic gait.      Examination of bilateral knees shows no rashes or erythema. There are no masses ecchymosis or effusion. Patient has full range of motion from 0-130°. Patient is nontender to palpation over lateral joint line and mildly tender to palpation over the medial joint line. Knee is stable to varus and valgus stress. 5 out of 5 motor strength. Palpable distal pulses. Intact light touch sensation.  Mild Patellofemoral crepitus    Examination left hand shows swelling particularly ulnarly.  Tenderness over the 5th metacarpal.  Patient has decreased range of motion of the 4th and 5th digits due to pain.  Neurovascularly intact    X-rays: X-ray of both knees are  reviewed which show mild to moderate degenerative changes.  Moderate medial narrowing.  No significant progression over the years.  X-rays of the right hip are  review which show some mild hip osteoarthritis  X-rays left hand are  review which show a 5th metacarpal neck fracture with acceptable alignment     Assessment:: Mild to moderate knee arthritis  Left 5th metacarpal neck fracture      Plan:  I reviewed the findings with her today.  I injected both knees with Synvisc 3 of 3 today.  Follow up in 2 weeks with another x-ray of the left hand    The patient has tried a self guided exercise program that has included walking without significant improvement. Minimal relief with tylenol or OTC Nsaids. Reports less than 8 weeks relief with IA steroid injection. Kellgren Miguel scale of 3. They are not receiving another HA injectable at this time. I will precert for gel injection.

## 2024-09-05 NOTE — PROCEDURES
Large Joint Aspiration/Injection: bilateral knee    Date/Time: 9/5/2024 8:45 AM    Performed by: Celso Cortes MD  Authorized by: Celso Cortes MD    Consent Done?:  Yes (Verbal)  Indications:  Pain  Site marked: the procedure site was marked    Timeout: prior to procedure the correct patient, procedure, and site was verified      Details:  Needle Size:  20 G  Approach:  Anterolateral  Location:  Knee  Laterality:  Bilateral  Site:  Bilateral knee  Medications (Right):  16 mg hyaluronate 16 mg/2 mL  Medications (Left):  16 mg hyaluronate 16 mg/2 mL  Patient tolerance:  Patient tolerated the procedure well with no immediate complications

## 2024-09-18 DIAGNOSIS — M79.642 LEFT HAND PAIN: Primary | ICD-10-CM

## 2024-09-23 ENCOUNTER — HOSPITAL ENCOUNTER (OUTPATIENT)
Dept: RADIOLOGY | Facility: HOSPITAL | Age: 59
Discharge: HOME | End: 2024-09-23
Attending: ORTHOPAEDIC SURGERY
Payer: OTHER GOVERNMENT

## 2024-09-23 ENCOUNTER — OFFICE VISIT (OUTPATIENT)
Dept: ORTHOPEDICS | Facility: CLINIC | Age: 59
End: 2024-09-23
Payer: OTHER GOVERNMENT

## 2024-09-23 VITALS — HEIGHT: 67 IN | WEIGHT: 227.94 LBS | BODY MASS INDEX: 35.78 KG/M2

## 2024-09-23 DIAGNOSIS — S62.367D CLOSED NONDISPLACED FRACTURE OF NECK OF FIFTH METACARPAL BONE OF LEFT HAND WITH ROUTINE HEALING, SUBSEQUENT ENCOUNTER: Primary | ICD-10-CM

## 2024-09-23 DIAGNOSIS — M79.642 LEFT HAND PAIN: ICD-10-CM

## 2024-09-23 PROCEDURE — 99213 OFFICE O/P EST LOW 20 MIN: CPT | Mod: PBBFAC,PO | Performed by: ORTHOPAEDIC SURGERY

## 2024-09-23 PROCEDURE — 99214 OFFICE O/P EST MOD 30 MIN: CPT | Mod: 24,S$PBB,, | Performed by: ORTHOPAEDIC SURGERY

## 2024-09-23 PROCEDURE — 99999 PR PBB SHADOW E&M-EST. PATIENT-LVL III: CPT | Mod: PBBFAC,,, | Performed by: ORTHOPAEDIC SURGERY

## 2024-09-23 PROCEDURE — 73130 X-RAY EXAM OF HAND: CPT | Mod: 26,LT,, | Performed by: RADIOLOGY

## 2024-09-23 PROCEDURE — 73130 X-RAY EXAM OF HAND: CPT | Mod: TC,LT

## 2024-09-23 PROCEDURE — 99024 POSTOP FOLLOW-UP VISIT: CPT | Mod: S$PBB,,, | Performed by: ORTHOPAEDIC SURGERY

## 2024-09-23 NOTE — PROGRESS NOTES
Past Medical History:   Diagnosis Date    Anemia     Blood transfusion     Migraine     Positive PPD      DX- CXR OK- STARTED INH     Wears glasses     CONTACS       Past Surgical History:   Procedure Laterality Date    BREAST LUMPECTOMY      x 6 all benign     SECTION, CLASSIC      x 2    HEMORRHOID SURGERY      HYSTERECTOMY      2012    KIDNEY SURGERY      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  2006    TUBAL LIGATION         Current Outpatient Medications   Medication Sig    ergocalciferol (ERGOCALCIFEROL) 50,000 unit Cap Take 1 capsule (50,000 Units total) by mouth every 7 days.    valsartan-hydrochlorothiazide (DIOVAN-HCT) 160-12.5 mg per tablet Take 1 tablet by mouth once daily.     No current facility-administered medications for this visit.       Allergies   Allergen Reactions    Other      NARCOTICS MAKE ITCH, RESP DEPRESSION, EMOTIONAL       Family History   Problem Relation Name Age of Onset    Lung cancer Mother      Breast cancer Mother      Hypertension Mother      Breast cancer Maternal Aunt      Cancer Maternal Aunt          breast triple negative    Kidney disease Brother         Social History     Socioeconomic History    Marital status:    Tobacco Use    Smoking status: Never    Smokeless tobacco: Never   Substance and Sexual Activity    Alcohol use: Yes     Comment: VERY RARELY    Drug use: No    Sexual activity: Not Currently     Partners: Male       Chief Complaint:   Chief Complaint   Patient presents with    Left Hand - Pain     Pinky finger fx F/U       History of present illness: This is a 59-year-old female who is seen for bilateral knee pain.  Patient has had treatment years ago including viscosupplementation and cortisone.  She did great with the Synvisc series.  Cortisone never really helped her much.  Patient unfortunately had a fall onto an outstretched left hand.  Has a lot of pain and swelling.  Patient had a deformity of the left small finger in her   actually pulled on it which corrected some of it.   Has been wearing the TKO brace.  Slowly improving.  Pain is 0/10 today.        Answers submitted by the patient for this visit:  Orthopedics Questionnaire (Submitted on 3/5/2023)  unexpected weight change: No  appetite change : No  sleep disturbance: No  IMMUNOCOMPROMISED: No  nervous/ anxious: No  dysphoric mood: No  rash: No  visual disturbance: No  eye redness: No  eye pain: No  ear pain: No  tinnitus: No  hearing loss: No  sinus pressure : No  nosebleeds: No  enviro allergies: Yes  food allergies: No  cough: No  shortness of breath: No  sweating: No  dysuria: No  frequency: No  difficulty urinating: No  hematuria: No  painful intercourse: No  chest pain: No  palpitations: No  nausea: No  vomiting: No  diarrhea: No  blood in stool: No  constipation: No  headaches: No  dizziness: No  numbness: No  seizures: No  joint swelling: Yes  myalgia: No  weakness: No  back pain: No   (Submitted on 3/5/2023)  Chief Complaint: Leg pain  Pain Chronicity: chronic  History of trauma: No  Onset: more than 1 year ago  Frequency: daily  Progression since onset: gradually worsening  injury location: at home  pain- numeric: 6/10  pain location: left knee, right knee, left ankle, left foot, right foot  pain quality: aching, sharp, tightness, shooting, throbbing  Radiating Pain: Yes  If your pain is radiating, to what part of the body?: left knee, right knee  Aggravating factors: activity, bending, bearing weight, exercise, extension, standing, flexion, twisting, walking  fever: No  inability to bear weight: Yes  itching: No  joint locking: Yes  limited range of motion: Yes  stiffness: Yes  tingling: No  Treatments tried: brace/corset, exercise, injection treatment, movement, NSAIDs, OTC ointments, rest  physical therapy: not tried  Improvement on treatment: no relief      Physical Examination:    Vital Signs:    There were no vitals filed for this visit.          Body mass index is  35.7 kg/m².    This a well-developed, well nourished patient in no acute distress.  They are alert and oriented and cooperative to examination.  Pt. walks without an antalgic gait.      Examination of bilateral knees shows no rashes or erythema. There are no masses ecchymosis or effusion. Patient has full range of motion from 0-130°. Patient is nontender to palpation over lateral joint line and mildly tender to palpation over the medial joint line. Knee is stable to varus and valgus stress. 5 out of 5 motor strength. Palpable distal pulses. Intact light touch sensation.  Mild Patellofemoral crepitus    Examination left hand shows much less swelling particularly ulnarly.  Tenderness over the 5th metacarpal.  Patient has decreased range of motion of the 4th and 5th digits due to pain but slowly improving..  Neurovascularly intact    X-rays: X-ray of both knees are  reviewed which show mild to moderate degenerative changes.  Moderate medial narrowing.  No significant progression over the years.  X-rays of the right hip are  review which show some mild hip osteoarthritis  X-rays left hand are ordered and review which show a 5th metacarpal neck fracture with acceptable alignment early callus noted     Assessment:: Mild to moderate knee arthritis  Left 5th metacarpal neck fracture      Plan:  I reviewed the findings with her today.  Follow up in another month with x-rays of the left hand.  May start to wean out of the brace.  Start working on range of motion still.    The patient has tried a self guided exercise program that has included walking without significant improvement. Minimal relief with tylenol or OTC Nsaids. Reports less than 8 weeks relief with IA steroid injection. Kellgren Miguel scale of 3. They are not receiving another HA injectable at this time. I will precert for gel injection.

## 2024-10-10 DIAGNOSIS — S62.367D CLOSED NONDISPLACED FRACTURE OF NECK OF FIFTH METACARPAL BONE OF LEFT HAND WITH ROUTINE HEALING, SUBSEQUENT ENCOUNTER: Primary | ICD-10-CM

## 2024-10-21 ENCOUNTER — HOSPITAL ENCOUNTER (OUTPATIENT)
Dept: RADIOLOGY | Facility: HOSPITAL | Age: 59
Discharge: HOME | End: 2024-10-21
Attending: ORTHOPAEDIC SURGERY
Payer: OTHER GOVERNMENT

## 2024-10-21 ENCOUNTER — OFFICE VISIT (OUTPATIENT)
Dept: ORTHOPEDICS | Facility: CLINIC | Age: 59
End: 2024-10-21
Payer: OTHER GOVERNMENT

## 2024-10-21 VITALS — RESPIRATION RATE: 18 BRPM | WEIGHT: 227.94 LBS | BODY MASS INDEX: 35.78 KG/M2 | HEIGHT: 67 IN

## 2024-10-21 DIAGNOSIS — S62.367D CLOSED NONDISPLACED FRACTURE OF NECK OF FIFTH METACARPAL BONE OF LEFT HAND WITH ROUTINE HEALING, SUBSEQUENT ENCOUNTER: Primary | ICD-10-CM

## 2024-10-21 DIAGNOSIS — S62.367D CLOSED NONDISPLACED FRACTURE OF NECK OF FIFTH METACARPAL BONE OF LEFT HAND WITH ROUTINE HEALING, SUBSEQUENT ENCOUNTER: ICD-10-CM

## 2024-10-21 PROCEDURE — 99999 PR PBB SHADOW E&M-EST. PATIENT-LVL III: CPT | Mod: PBBFAC,,, | Performed by: ORTHOPAEDIC SURGERY

## 2024-10-21 PROCEDURE — 73130 X-RAY EXAM OF HAND: CPT | Mod: 26,LT,, | Performed by: RADIOLOGY

## 2024-10-21 PROCEDURE — 73130 X-RAY EXAM OF HAND: CPT | Mod: TC,LT

## 2024-10-21 PROCEDURE — 99213 OFFICE O/P EST LOW 20 MIN: CPT | Mod: PBBFAC,PO | Performed by: ORTHOPAEDIC SURGERY

## 2024-10-21 NOTE — PROGRESS NOTES
Past Medical History:   Diagnosis Date    Anemia     Blood transfusion     Migraine     Positive PPD      DX- CXR OK- STARTED INH     Wears glasses     CONTACS       Past Surgical History:   Procedure Laterality Date    BREAST LUMPECTOMY      x 6 all benign     SECTION, CLASSIC      x 2    HEMORRHOID SURGERY      HYSTERECTOMY      2012    KIDNEY SURGERY      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  2006    TUBAL LIGATION         Current Outpatient Medications   Medication Sig    ergocalciferol (ERGOCALCIFEROL) 50,000 unit Cap Take 1 capsule (50,000 Units total) by mouth every 7 days.    valsartan-hydrochlorothiazide (DIOVAN-HCT) 160-12.5 mg per tablet Take 1 tablet by mouth once daily.     No current facility-administered medications for this visit.       Allergies   Allergen Reactions    Other      NARCOTICS MAKE ITCH, RESP DEPRESSION, EMOTIONAL       Family History   Problem Relation Name Age of Onset    Lung cancer Mother      Breast cancer Mother      Hypertension Mother      Breast cancer Maternal Aunt      Cancer Maternal Aunt          breast triple negative    Kidney disease Brother         Social History     Socioeconomic History    Marital status:    Tobacco Use    Smoking status: Never    Smokeless tobacco: Never   Substance and Sexual Activity    Alcohol use: Yes     Comment: VERY RARELY    Drug use: No    Sexual activity: Not Currently     Partners: Male     Social Drivers of Health     Financial Resource Strain: Low Risk  (10/20/2024)    Overall Financial Resource Strain (CARDIA)     Difficulty of Paying Living Expenses: Not hard at all   Food Insecurity: No Food Insecurity (10/20/2024)    Hunger Vital Sign     Worried About Running Out of Food in the Last Year: Never true     Ran Out of Food in the Last Year: Never true   Physical Activity: Sufficiently Active (10/20/2024)    Exercise Vital Sign     Days of Exercise per Week: 5 days     Minutes of Exercise per Session: 30 min    Stress: No Stress Concern Present (10/20/2024)    [employer] - Occupational Stress Questionnaire     Feeling of Stress : Only a little   Housing Stability: Unknown (10/20/2024)    Housing Stability Vital Sign     Unable to Pay for Housing in the Last Year: No       Chief Complaint:   Chief Complaint   Patient presents with    Follow-up     Left hand 5th MC fracture. Doi 8/24/24       History of present illness: This is a 59-year-old female who is seen for bilateral knee pain.  Patient has had treatment years ago including viscosupplementation and cortisone.  She did great with the Synvisc series.  Cortisone never really helped her much.  Patient unfortunately had a fall onto an outstretched left hand.  Has a lot of pain and swelling.  Patient had a deformity of the left small finger in her  actually pulled on it which corrected some of it.   Had been wearing the TKO brace.  Slowly improving.  Pain is 0/10 today.        Answers submitted by the patient for this visit:  Orthopedics Questionnaire (Submitted on 3/5/2023)  unexpected weight change: No  appetite change : No  sleep disturbance: No  IMMUNOCOMPROMISED: No  nervous/ anxious: No  dysphoric mood: No  rash: No  visual disturbance: No  eye redness: No  eye pain: No  ear pain: No  tinnitus: No  hearing loss: No  sinus pressure : No  nosebleeds: No  enviro allergies: Yes  food allergies: No  cough: No  shortness of breath: No  sweating: No  dysuria: No  frequency: No  difficulty urinating: No  hematuria: No  painful intercourse: No  chest pain: No  palpitations: No  nausea: No  vomiting: No  diarrhea: No  blood in stool: No  constipation: No  headaches: No  dizziness: No  numbness: No  seizures: No  joint swelling: Yes  myalgia: No  weakness: No  back pain: No   (Submitted on 3/5/2023)  Chief Complaint: Leg pain  Pain Chronicity: chronic  History of trauma: No  Onset: more than 1 year ago  Frequency: daily  Progression since  onset: gradually worsening  injury location: at home  pain- numeric: 6/10  pain location: left knee, right knee, left ankle, left foot, right foot  pain quality: aching, sharp, tightness, shooting, throbbing  Radiating Pain: Yes  If your pain is radiating, to what part of the body?: left knee, right knee  Aggravating factors: activity, bending, bearing weight, exercise, extension, standing, flexion, twisting, walking  fever: No  inability to bear weight: Yes  itching: No  joint locking: Yes  limited range of motion: Yes  stiffness: Yes  tingling: No  Treatments tried: brace/corset, exercise, injection treatment, movement, NSAIDs, OTC ointments, rest  physical therapy: not tried  Improvement on treatment: no relief      Physical Examination:    Vital Signs:    There were no vitals filed for this visit.          There is no height or weight on file to calculate BMI.    This a well-developed, well nourished patient in no acute distress.  They are alert and oriented and cooperative to examination.  Pt. walks without an antalgic gait.      Examination left hand shows much less swelling particularly ulnarly.  Minimal Tenderness over the 5th metacarpal.  Patient has almost full ability to make a fist with just a little bit of tightness of the MCP and PIP joint of the small finger.    X-rays: X-ray of both knees are  reviewed which show mild to moderate degenerative changes.  Moderate medial narrowing.  No significant progression over the years.  X-rays of the right hip are  review which show some mild hip osteoarthritis  X-rays left hand are ordered and review which show a 5th metacarpal neck fracture with acceptable alignment early callus noted     Assessment:: Mild to moderate knee arthritis  Left 5th metacarpal neck fracture      Plan:  I reviewed the findings with her today.  Follow up in six weeks with 1 last x-ray of the hand.  Continue to work on range of motion and wean out of the brace more and more.    The  patient has tried a self guided exercise program that has included walking without significant improvement. Minimal relief with tylenol or OTC Nsaids. Reports less than 8 weeks relief with IA steroid injection. Kellgren Miguel scale of 3. They are not receiving another HA injectable at this time. I will precert for gel injection.

## 2024-10-23 ENCOUNTER — OFFICE VISIT (OUTPATIENT)
Dept: FAMILY MEDICINE | Facility: CLINIC | Age: 59
End: 2024-10-23
Payer: OTHER GOVERNMENT

## 2024-10-23 VITALS
DIASTOLIC BLOOD PRESSURE: 74 MMHG | WEIGHT: 224 LBS | BODY MASS INDEX: 35.16 KG/M2 | HEART RATE: 84 BPM | OXYGEN SATURATION: 98 % | SYSTOLIC BLOOD PRESSURE: 110 MMHG | HEIGHT: 67 IN

## 2024-10-23 DIAGNOSIS — R53.83 FATIGUE, UNSPECIFIED TYPE: ICD-10-CM

## 2024-10-23 DIAGNOSIS — Z12.11 SCREENING FOR MALIGNANT NEOPLASM OF COLON: ICD-10-CM

## 2024-10-23 DIAGNOSIS — Z23 FLU VACCINE NEED: ICD-10-CM

## 2024-10-23 DIAGNOSIS — Z00.01 ENCOUNTER FOR GENERAL ADULT MEDICAL EXAMINATION WITH ABNORMAL FINDINGS: Primary | ICD-10-CM

## 2024-10-23 DIAGNOSIS — Z12.11 ENCOUNTER FOR SCREENING COLONOSCOPY: ICD-10-CM

## 2024-10-23 DIAGNOSIS — I10 PRIMARY HYPERTENSION: ICD-10-CM

## 2024-10-23 DIAGNOSIS — Z78.0 MENOPAUSE: ICD-10-CM

## 2024-10-23 DIAGNOSIS — R06.83 SNORING: ICD-10-CM

## 2024-10-23 DIAGNOSIS — E55.9 VITAMIN D DEFICIENCY: ICD-10-CM

## 2024-10-23 PROCEDURE — 90471 IMMUNIZATION ADMIN: CPT | Mod: S$GLB,,, | Performed by: FAMILY MEDICINE

## 2024-10-23 PROCEDURE — 99396 PREV VISIT EST AGE 40-64: CPT | Mod: 25,S$GLB,, | Performed by: FAMILY MEDICINE

## 2024-10-23 PROCEDURE — 90656 IIV3 VACC NO PRSV 0.5 ML IM: CPT | Mod: S$GLB,,, | Performed by: FAMILY MEDICINE

## 2024-10-23 RX ORDER — VALSARTAN AND HYDROCHLOROTHIAZIDE 160; 12.5 MG/1; MG/1
1 TABLET, FILM COATED ORAL DAILY
Qty: 90 TABLET | Refills: 3 | Status: SHIPPED | OUTPATIENT
Start: 2024-10-23 | End: 2025-10-23

## 2024-10-23 NOTE — PROGRESS NOTES
SUBJECTIVE:   HPI: Sloane Dimas  is a 59 y.o. female who presents for annual physical .   Follow-up (6 mo f/u//no med bottles//amb ref to yeny//fluarix ordered//tc)    Patient hypertension is in for annual visit.  Has been doing very well on Diovan HCTZ.  Previously had issue with headaches but these have resolved.      She is up-to-date with mammogram.  Required diagnostic and ultrasound.  Does have family history of triple negative breast cancer in her mother.    Previous labs with no abnormalities.  She is up-to-date with vision and dental screenings.  She is a nonsmoker.  Does have low vitamin-D and is taken supplements.    She has had 2 injuries this year that resulted in digit fractures.  One in her toe and 1 in her hand.  She is a postmenopausal female who has never had a bone density.    Mammogram is due.  She has seen Dr. Pulliam in the past.  Recent workup for some GI issues.  Has not had a colonoscopy yet however.      Patient reports that over the past year she has been experiencing fatigue.  Wakes up tired.  Has some fogginess with her memory.  Could go to sleep if she is sitting watching television.   reports that she has started snoring.  She feels that she goes to bed and a timely manner and not much disturbed her to wake her up.          (Not in a hospital admission)    Review of patient's allergies indicates:   Allergen Reactions    Other      NARCOTICS MAKE ITCH, RESP DEPRESSION, EMOTIONAL     Current Outpatient Medications on File Prior to Visit   Medication Sig Dispense Refill    ergocalciferol (ERGOCALCIFEROL) 50,000 unit Cap Take 1 capsule (50,000 Units total) by mouth every 7 days. 12 capsule 3    [DISCONTINUED] valsartan-hydrochlorothiazide (DIOVAN-HCT) 160-12.5 mg per tablet Take 1 tablet by mouth once daily. 90 tablet 3     No current facility-administered medications on file prior to visit.     Past Medical History:   Diagnosis Date    Anemia     Blood transfusion      Migraine     Positive PPD      DX- CXR OK- STARTED INH     Wears glasses     CONTACS     Past Surgical History:   Procedure Laterality Date    BREAST LUMPECTOMY      x 6 all benign     SECTION, CLASSIC      x 2    HEMORRHOID SURGERY      HYSTERECTOMY      2012    KIDNEY SURGERY      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  2006    TUBAL LIGATION       Family History   Problem Relation Name Age of Onset    Lung cancer Mother      Breast cancer Mother      Hypertension Mother      Breast cancer Maternal Aunt      Cancer Maternal Aunt          breast triple negative    Kidney disease Brother       Social History     Tobacco Use    Smoking status: Never    Smokeless tobacco: Never   Substance Use Topics    Alcohol use: Yes     Comment: VERY RARELY    Drug use: No      Health Maintenance Topics with due status: Not Due       Topic Last Completion Date    Lipid Panel 2023    Hemoglobin A1c (Prediabetes) 2023    Mammogram 2024    RSV Vaccine (Age 60+ and Pregnant patients) Not Due     Immunization History   Administered Date(s) Administered    COVID-19 MRNA, LN-S PF (MODERNA HALF 0.25 ML DOSE) 11/10/2021, 2022    COVID-19, vector-nr, rS-Ad26, PF (Verenice) 2021    Influenza 2020    Influenza (FLUBLOK) - Quadrivalent - Recombinant - PF *Preferred* (egg allergy) 2021, 2023    Influenza - Quadrivalent - PF *Preferred* (6 months and older) 2020    Influenza - Trivalent - Fluarix, Flulaval, Fluzone, Afluria - PF 10/23/2024       Review of Systems   Constitutional:  Positive for fatigue. Negative for activity change and unexpected weight change.   HENT:  Negative for hearing loss, rhinorrhea and trouble swallowing.    Eyes:  Negative for discharge and visual disturbance.   Respiratory:  Negative for chest tightness and wheezing.    Cardiovascular:  Negative for chest pain and palpitations.   Gastrointestinal:  Negative for blood in stool, constipation, diarrhea  "and vomiting.   Endocrine: Negative for polydipsia and polyuria.   Genitourinary:  Negative for difficulty urinating, dysuria, hematuria and menstrual problem.   Musculoskeletal:  Negative for arthralgias, joint swelling and neck pain.   Neurological:  Negative for weakness and headaches.   Psychiatric/Behavioral:  Negative for confusion and dysphoric mood.       OBJECTIVE:          10/21/2024     9:49 AM 10/23/2024     9:40 AM   Vitals - 1 value per visit   SYSTOLIC  110   DIASTOLIC  74   Pulse  84   Resp 18    SPO2  98 %   Weight (lb) 227.96 224   Weight (kg) 103.4 101.606   Height 5' 7" (1.702 m) 5' 7" (1.702 m)   BMI (Calculated) 35.7 35.1   Pain Score Two       Physical Exam  Constitutional:       Appearance: Normal appearance.   HENT:      Head: Normocephalic and atraumatic.      Mouth/Throat:      Mouth: Mucous membranes are moist.   Eyes:      Conjunctiva/sclera: Conjunctivae normal.   Pulmonary:      Effort: Pulmonary effort is normal.   Neurological:      General: No focal deficit present.      Mental Status: She is alert and oriented to person, place, and time.   Psychiatric:         Mood and Affect: Mood normal.         Behavior: Behavior normal.        Assessment:       1. Encounter for general adult medical examination with abnormal findings    2. Primary hypertension    3. Encounter for screening colonoscopy    4. Screening for malignant neoplasm of colon    5. Vitamin D deficiency    6. Flu vaccine need    7. Fatigue, unspecified type    8. Menopause    9. Snoring        Plan:       Encounter for general adult medical examination with abnormal findings    Primary hypertension  -     valsartan-hydrochlorothiazide (DIOVAN-HCT) 160-12.5 mg per tablet; Take 1 tablet by mouth once daily.  Dispense: 90 tablet; Refill: 3  -     Comprehensive Metabolic Panel; Future; Expected date: 10/23/2024    Encounter for screening colonoscopy  Comments:  will schedule with Dr. Pulliam    Screening for malignant neoplasm " of colon    Vitamin D deficiency  -     Vitamin D; Future; Expected date: 10/23/2024    Flu vaccine need  -     influenza (Flulaval, Fluzone, Fluarix) 45 mcg/0.5 mL IM vaccine (> or = 6 mo) 0.5 mL    Fatigue, unspecified type  Comments:  also with snoring  Orders:  -     Ambulatory referral/consult to Pulmonology; Future; Expected date: 10/30/2024  -     Comprehensive Metabolic Panel; Future; Expected date: 10/23/2024  -     TSH; Future; Expected date: 10/23/2024    Menopause  -     DXA Bone Density Axial Skeleton 1 or more sites; Future; Expected date: 10/23/2024    Snoring  -     Ambulatory referral/consult to Pulmonology; Future; Expected date: 10/30/2024        Counseled on age and gender appropriate medical preventative services, including cancer screenings, immunizations, overall nutritional health, need for a consistent exercise regimen and an overall push towards maintaining a vigorous and active lifestyle.      Follow up in about 6 months (around 4/23/2025) for HTN.

## 2024-10-24 LAB
25(OH)D3+25(OH)D2 SERPL-MCNC: 87 NG/ML (ref 30–100)
ALBUMIN SERPL-MCNC: 4.2 G/DL (ref 3.6–5.1)
ALBUMIN/GLOB SERPL: 1.4 (CALC) (ref 1–2.5)
ALP SERPL-CCNC: 87 U/L (ref 37–153)
ALT SERPL-CCNC: 15 U/L (ref 6–29)
AST SERPL-CCNC: 16 U/L (ref 10–35)
BILIRUB SERPL-MCNC: 0.4 MG/DL (ref 0.2–1.2)
BUN SERPL-MCNC: 15 MG/DL (ref 7–25)
BUN/CREAT SERPL: 14 (CALC) (ref 6–22)
CALCIUM SERPL-MCNC: 9.9 MG/DL (ref 8.6–10.4)
CHLORIDE SERPL-SCNC: 98 MMOL/L (ref 98–110)
CO2 SERPL-SCNC: 30 MMOL/L (ref 20–32)
CREAT SERPL-MCNC: 1.06 MG/DL (ref 0.5–1.03)
EGFR: 61 ML/MIN/1.73M2
GLOBULIN SER CALC-MCNC: 3 G/DL (CALC) (ref 1.9–3.7)
GLUCOSE SERPL-MCNC: 94 MG/DL (ref 65–99)
POTASSIUM SERPL-SCNC: 3.9 MMOL/L (ref 3.5–5.3)
PROT SERPL-MCNC: 7.2 G/DL (ref 6.1–8.1)
SODIUM SERPL-SCNC: 139 MMOL/L (ref 135–146)
TSH SERPL-ACNC: 1.48 MIU/L (ref 0.4–4.5)

## 2024-11-01 ENCOUNTER — HOSPITAL ENCOUNTER (OUTPATIENT)
Dept: RADIOLOGY | Facility: HOSPITAL | Age: 59
Discharge: HOME | End: 2024-11-01
Attending: FAMILY MEDICINE
Payer: OTHER GOVERNMENT

## 2024-11-01 DIAGNOSIS — Z78.0 MENOPAUSE: ICD-10-CM

## 2024-11-01 PROCEDURE — 77080 DXA BONE DENSITY AXIAL: CPT | Mod: 26,,, | Performed by: RADIOLOGY

## 2024-11-01 PROCEDURE — 77080 DXA BONE DENSITY AXIAL: CPT | Mod: TC,PO

## 2024-11-25 DIAGNOSIS — S62.367D CLOSED NONDISPLACED FRACTURE OF NECK OF FIFTH METACARPAL BONE OF LEFT HAND WITH ROUTINE HEALING, SUBSEQUENT ENCOUNTER: Primary | ICD-10-CM

## 2024-12-02 ENCOUNTER — HOSPITAL ENCOUNTER (OUTPATIENT)
Dept: RADIOLOGY | Facility: HOSPITAL | Age: 59
Discharge: HOME | End: 2024-12-02
Attending: ORTHOPAEDIC SURGERY
Payer: OTHER GOVERNMENT

## 2024-12-02 ENCOUNTER — OFFICE VISIT (OUTPATIENT)
Dept: ORTHOPEDICS | Facility: CLINIC | Age: 59
End: 2024-12-02
Payer: OTHER GOVERNMENT

## 2024-12-02 VITALS — HEIGHT: 67 IN | WEIGHT: 224 LBS | BODY MASS INDEX: 35.16 KG/M2 | RESPIRATION RATE: 18 BRPM

## 2024-12-02 DIAGNOSIS — S62.367D CLOSED NONDISPLACED FRACTURE OF NECK OF FIFTH METACARPAL BONE OF LEFT HAND WITH ROUTINE HEALING, SUBSEQUENT ENCOUNTER: Primary | ICD-10-CM

## 2024-12-02 DIAGNOSIS — S62.367D CLOSED NONDISPLACED FRACTURE OF NECK OF FIFTH METACARPAL BONE OF LEFT HAND WITH ROUTINE HEALING, SUBSEQUENT ENCOUNTER: ICD-10-CM

## 2024-12-02 PROCEDURE — 99999 PR PBB SHADOW E&M-EST. PATIENT-LVL III: CPT | Mod: PBBFAC,,, | Performed by: ORTHOPAEDIC SURGERY

## 2024-12-02 PROCEDURE — 73130 X-RAY EXAM OF HAND: CPT | Mod: 26,LT,, | Performed by: RADIOLOGY

## 2024-12-02 PROCEDURE — 99213 OFFICE O/P EST LOW 20 MIN: CPT | Mod: PBBFAC,25,PO | Performed by: ORTHOPAEDIC SURGERY

## 2024-12-02 PROCEDURE — 73130 X-RAY EXAM OF HAND: CPT | Mod: TC,PO,LT

## 2024-12-02 PROCEDURE — 99214 OFFICE O/P EST MOD 30 MIN: CPT | Mod: S$PBB,,, | Performed by: ORTHOPAEDIC SURGERY

## 2024-12-17 ENCOUNTER — OFFICE VISIT (OUTPATIENT)
Dept: PULMONOLOGY | Facility: CLINIC | Age: 59
End: 2024-12-17
Payer: OTHER GOVERNMENT

## 2024-12-17 VITALS
BODY MASS INDEX: 35.5 KG/M2 | SYSTOLIC BLOOD PRESSURE: 141 MMHG | WEIGHT: 226.19 LBS | HEART RATE: 79 BPM | HEIGHT: 67 IN | OXYGEN SATURATION: 99 % | DIASTOLIC BLOOD PRESSURE: 92 MMHG

## 2024-12-17 DIAGNOSIS — R06.02 SHORTNESS OF BREATH: Primary | ICD-10-CM

## 2024-12-17 DIAGNOSIS — G47.30 SLEEP APNEA, UNSPECIFIED TYPE: ICD-10-CM

## 2024-12-17 DIAGNOSIS — R06.83 SNORING: ICD-10-CM

## 2024-12-17 DIAGNOSIS — R53.83 FATIGUE, UNSPECIFIED TYPE: ICD-10-CM

## 2024-12-17 PROCEDURE — 99213 OFFICE O/P EST LOW 20 MIN: CPT | Mod: PBBFAC,PO | Performed by: NURSE PRACTITIONER

## 2024-12-17 PROCEDURE — 99999 PR PBB SHADOW E&M-EST. PATIENT-LVL III: CPT | Mod: PBBFAC,,, | Performed by: NURSE PRACTITIONER

## 2024-12-17 NOTE — PROGRESS NOTES
2024    Sloane Dimas  New Patient Consult    Chief Complaint   Patient presents with    Sleep Apnea    Fatigue    Snoring       HPI: 2024- referred by PCP for complaint of daytime fatigue,  states she snores nightly, EPWORTH 12  Associated with waking up feeling tired, body aches, mental grogginess.   Complaint of shortness seasonally, has to sleep with head propped up. Associated with non productive cough, improves on its own after 2 weeks.     Social Hx: lives with family, no pets, currently in pharmaceuticals sales, no Asbestosis exposure, no Smoking Hx  Family Hx: Mother Lung Cancer ( 71 age) and breast cancer, no COPD, no Asthma, Sister NALDO  Medical Hx: no previous pneumonia ; previous shoulder / no chest surgery      The chief compliant  problem is new to me  PFSH:  Past Medical History:   Diagnosis Date    Anemia     Blood transfusion     Migraine     Positive PPD      DX- CXR OK- STARTED INH     Wears glasses     CONTACS         Past Surgical History:   Procedure Laterality Date    BREAST LUMPECTOMY      x 6 all benign     SECTION, CLASSIC      x 2    HEMORRHOID SURGERY      HYSTERECTOMY      2012    KIDNEY SURGERY      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  2006    TUBAL LIGATION       Social History     Tobacco Use    Smoking status: Never    Smokeless tobacco: Never   Substance Use Topics    Alcohol use: Yes     Comment: VERY RARELY    Drug use: No     Family History   Problem Relation Name Age of Onset    Lung cancer Mother      Breast cancer Mother      Hypertension Mother      Breast cancer Maternal Aunt      Cancer Maternal Aunt          breast triple negative    Kidney disease Brother       Review of patient's allergies indicates:   Allergen Reactions    Other      NARCOTICS MAKE ITCH, RESP DEPRESSION, EMOTIONAL    Hay fever and allergy relief Other (See Comments)     I have reviewed past medical, family, and social history. I have reviewed previous  "nurse notes.        Performance Status:The patient's activity level is no limits with regular activity.          Review of Systems:  a review of eleven systems covering constitutional, Eye, HEENT, Psych, Respiratory, Cardiac, GI, , Musculoskeletal, Endocrine, Dermatologic was negative except for pertinent findings as listed ABOVE and below: pertinent positive as above, rest is good  Fatigue  Shortness of breath  Mental grogginess           Exam:Comprehensive exam done. BP (!) 141/92 (BP Location: Right forearm, Patient Position: Sitting)   Pulse 79   Ht 5' 7" (1.702 m)   Wt 102.6 kg (226 lb 3.1 oz)   LMP 07/12/2012   SpO2 99% Comment: on room air at rest  BMI 35.43 kg/m²   Exam included Vitals as listed, and patient's appearance and affect and alertness and mood, oral exam for yeast and hygiene and pharynx lesions and Mallapatti (M) score, neck with inspection for jvd and masses and thyroid abnormalities and lymph nodes (supraclavicular and infraclavicular nodes and axillary also examined and noted if abn), chest exam included symmetry and effort and fremitus and percussion and auscultation, cardiac exam included rhythm and gallops and murmur and rubs and jvd and edema, abdominal exam for mass and hepatosplenomegaly and tenderness and hernias and bowel sounds, Musculoskeletal exam with muscle tone and posture and mobility/gait and  strength, and skin for rashes and cyanosis and pallor and turgor, extremity for clubbing.  Findings were normal except for pertinent findings listed below:   M2  Breath sounds clear      Radiographs (ct chest and cxr) reviewed: none available for review in [facility] Everywhere  patient imaging studies reviewed and interpreted independently. My personal interpretation of most resent images include:      Labs: Patients labs reviewed including CBC and C02  reviewed       Lab Results   Component Value Date    WBC 7.5 12/12/2023    RBC 4.49 12/12/2023    HGB 12.8 12/12/2023    " HCT 39.2 12/12/2023    MCV 87.3 12/12/2023    MCH 28.5 12/12/2023    MCHC 32.7 12/12/2023    RDW 13.1 12/12/2023     12/12/2023    MPV 10.1 12/12/2023    GRAN 2.2 01/14/2020    GRAN 41.6 01/14/2020    LYMPH 3,413 12/12/2023    LYMPH 45.5 12/12/2023    MONO 338 12/12/2023    MONO 4.5 12/12/2023    EOS 60 12/12/2023    BASO 38 12/12/2023    EOSINOPHIL 0.8 12/12/2023    BASOPHIL 0.5 12/12/2023      Latest Reference Range & Units 02/06/23 12:46 12/12/23 12:48 10/23/24 10:55   CO2 20 - 32 mmol/L 29 27 30         Plan:  Clinical impression is apparently straight forward and impression with management as below.    Sloane was seen today for sleep apnea, fatigue and snoring.    Diagnoses and all orders for this visit:    Shortness of breath  -     Complete PFT with bronchodilator; Future  -     X-Ray Chest PA And Lateral; Future    Fatigue, unspecified type  Comments:  also with snoring  Orders:  -     Ambulatory referral/consult to Pulmonology  -     Complete PFT with bronchodilator; Future  -     X-Ray Chest PA And Lateral; Future  -     Home Sleep Study; Future    Snoring  -     Ambulatory referral/consult to Pulmonology  -     Home Sleep Study; Future    Sleep apnea, unspecified type  -     Home Sleep Study; Future          Follow up in about 3 months (around 3/17/2025), or if symptoms worsen or fail to improve, for Virtual Visit.        Discussed with patient above for education the following:      Patient Instructions   Ordering overnight sleep study at home to evaluate for sleep apnea    If positive will order CPAP machine, notify clinic when machine is delivered to home to set up 31 days compliance appointment. You must use the machine for a least 4 hours every night.     Ordering two tests to just evaluate your lung health.

## 2024-12-17 NOTE — PATIENT INSTRUCTIONS
Ordering overnight sleep study at home to evaluate for sleep apnea    If positive will order CPAP machine, notify clinic when machine is delivered to home to set up 31 days compliance appointment. You must use the machine for a least 4 hours every night.     Ordering two tests to just evaluate your lung health.

## 2025-02-04 ENCOUNTER — HOSPITAL ENCOUNTER (OUTPATIENT)
Dept: PULMONOLOGY | Facility: HOSPITAL | Age: 60
Discharge: HOME | End: 2025-02-04
Attending: NURSE PRACTITIONER
Payer: OTHER GOVERNMENT

## 2025-02-04 ENCOUNTER — HOSPITAL ENCOUNTER (OUTPATIENT)
Dept: RADIOLOGY | Facility: HOSPITAL | Age: 60
Discharge: HOME | End: 2025-02-04
Attending: NURSE PRACTITIONER
Payer: OTHER GOVERNMENT

## 2025-02-04 DIAGNOSIS — R06.02 SHORTNESS OF BREATH: ICD-10-CM

## 2025-02-04 DIAGNOSIS — R53.83 FATIGUE, UNSPECIFIED TYPE: ICD-10-CM

## 2025-02-04 LAB
DLCO SINGLE BREATH LLN: 19.02
DLCO SINGLE BREATH PRE REF: 76 %
DLCO SINGLE BREATH REF: 24.75
DLCOC SBVA LLN: 3.21
DLCOC SBVA REF: 4.55
DLCOC SINGLE BREATH LLN: 19.02
DLCOC SINGLE BREATH REF: 24.75
DLCOVA LLN: 3.21
DLCOVA PRE REF: 96.8 %
DLCOVA PRE: 4.4 ML/(MIN*MMHG*L) (ref 3.21–5.89)
DLCOVA REF: 4.55
ERV LLN: -16449.15
ERV PRE REF: 54.2 %
ERV REF: 0.85
FEF 25 75 CHG: 17.5 %
FEF 25 75 LLN: 1.64
FEF 25 75 POST REF: 83.5 %
FEF 25 75 PRE REF: 71.1 %
FEF 25 75 REF: 3.04
FET100 CHG: 0.7 %
FEV1 CHG: 3.2 %
FEV1 FVC CHG: 4.5 %
FEV1 FVC LLN: 68
FEV1 FVC POST REF: 104.3 %
FEV1 FVC PRE REF: 99.8 %
FEV1 FVC REF: 79
FEV1 LLN: 1.73
FEV1 POST REF: 100.7 %
FEV1 PRE REF: 97.6 %
FEV1 REF: 2.37
FRCPLETH LLN: 2.05
FRCPLETH PREREF: 57 %
FRCPLETH REF: 2.87
FVC CHG: -1.3 %
FVC LLN: 2.22
FVC POST REF: 95.9 %
FVC PRE REF: 97.1 %
FVC REF: 3.01
IVC PRE: 2.85 L (ref 2.22–3.84)
IVC SINGLE BREATH LLN: 2.22
IVC SINGLE BREATH PRE REF: 94.8 %
IVC SINGLE BREATH REF: 3.01
MVV LLN: 90
MVV PRE REF: 87 %
MVV REF: 106
PEF CHG: 2.9 %
PEF LLN: 3.97
PEF POST REF: 106.4 %
PEF PRE REF: 103.4 %
PEF REF: 6.17
POST FEF 25 75: 2.54 L/S (ref 1.64–4.44)
POST FET 100: 6.91 SEC
POST FEV1 FVC: 82.82 % (ref 67.77–89.34)
POST FEV1: 2.39 L (ref 1.73–2.99)
POST FVC: 2.89 L (ref 2.22–3.84)
POST PEF: 6.57 L/S (ref 3.97–8.37)
PRE DLCO: 18.8 ML/(MIN*MMHG) (ref 19.02–30.49)
PRE ERV: 0.46 L (ref -16449.15–16450.85)
PRE FEF 25 75: 2.16 L/S (ref 1.64–4.44)
PRE FET 100: 6.86 SEC
PRE FEV1 FVC: 79.24 % (ref 67.77–89.34)
PRE FEV1: 2.32 L (ref 1.73–2.99)
PRE FRC PL: 1.64 L (ref 2.05–3.69)
PRE FVC: 2.92 L (ref 2.22–3.84)
PRE MVV: 92.13 L/MIN (ref 90–121.77)
PRE PEF: 6.38 L/S (ref 3.97–8.37)
PRE RV: 1.18 L (ref 1.45–2.6)
PRE TLC: 4.1 L (ref 4.45–6.43)
RAW LLN: 3.06
RAW PRE REF: 69.2 %
RAW PRE: 2.12 CMH2O*S/L (ref 3.06–3.06)
RAW REF: 3.06
RV LLN: 1.45
RV PRE REF: 58.2 %
RV REF: 2.02
RVTLC LLN: 29
RVTLC PRE REF: 73.6 %
RVTLC PRE: 28.74 % (ref 29.43–48.61)
RVTLC REF: 39
TLC LLN: 4.45
TLC PRE REF: 75.4 %
TLC REF: 5.44
VA PRE: 4.27 L (ref 5.29–5.29)
VA SINGLE BREATH LLN: 5.29
VA SINGLE BREATH PRE REF: 80.7 %
VA SINGLE BREATH REF: 5.29
VC LLN: 2.22
VC PRE REF: 97.1 %
VC PRE: 2.92 L (ref 2.22–3.84)
VC REF: 3.01

## 2025-02-04 PROCEDURE — 94060 EVALUATION OF WHEEZING: CPT | Mod: 26,,, | Performed by: INTERNAL MEDICINE

## 2025-02-04 PROCEDURE — 94729 DIFFUSING CAPACITY: CPT | Mod: 26,,, | Performed by: INTERNAL MEDICINE

## 2025-02-04 PROCEDURE — 71046 X-RAY EXAM CHEST 2 VIEWS: CPT | Mod: 26,,, | Performed by: RADIOLOGY

## 2025-02-04 PROCEDURE — 94726 PLETHYSMOGRAPHY LUNG VOLUMES: CPT

## 2025-02-04 PROCEDURE — 71046 X-RAY EXAM CHEST 2 VIEWS: CPT | Mod: TC

## 2025-02-04 PROCEDURE — 94726 PLETHYSMOGRAPHY LUNG VOLUMES: CPT | Mod: 26,,, | Performed by: INTERNAL MEDICINE

## 2025-02-04 PROCEDURE — 94729 DIFFUSING CAPACITY: CPT

## 2025-02-04 PROCEDURE — 94060 EVALUATION OF WHEEZING: CPT

## 2025-02-04 RX ORDER — ALBUTEROL SULFATE 2.5 MG/.5ML
SOLUTION RESPIRATORY (INHALATION)
Status: DISCONTINUED
Start: 2025-02-04 | End: 2025-02-04 | Stop reason: HOSPADM

## 2025-02-27 ENCOUNTER — PROCEDURE VISIT (OUTPATIENT)
Dept: SLEEP MEDICINE | Facility: HOSPITAL | Age: 60
End: 2025-02-27
Attending: NURSE PRACTITIONER
Payer: OTHER GOVERNMENT

## 2025-02-27 DIAGNOSIS — R53.83 FATIGUE, UNSPECIFIED TYPE: ICD-10-CM

## 2025-02-27 DIAGNOSIS — R06.83 SNORING: ICD-10-CM

## 2025-02-27 DIAGNOSIS — G47.30 SLEEP APNEA, UNSPECIFIED TYPE: ICD-10-CM

## 2025-02-27 PROCEDURE — 95806 SLEEP STUDY UNATT&RESP EFFT: CPT

## 2025-03-07 ENCOUNTER — OFFICE VISIT (OUTPATIENT)
Dept: URGENT CARE | Facility: CLINIC | Age: 60
End: 2025-03-07
Payer: OTHER GOVERNMENT

## 2025-03-07 ENCOUNTER — HOSPITAL ENCOUNTER (EMERGENCY)
Facility: HOSPITAL | Age: 60
Discharge: HOME | End: 2025-03-07
Attending: EMERGENCY MEDICINE
Payer: OTHER GOVERNMENT

## 2025-03-07 VITALS
HEART RATE: 78 BPM | DIASTOLIC BLOOD PRESSURE: 68 MMHG | TEMPERATURE: 98 F | WEIGHT: 226 LBS | OXYGEN SATURATION: 98 % | HEIGHT: 67 IN | BODY MASS INDEX: 35.47 KG/M2 | RESPIRATION RATE: 16 BRPM | SYSTOLIC BLOOD PRESSURE: 121 MMHG

## 2025-03-07 VITALS
SYSTOLIC BLOOD PRESSURE: 112 MMHG | HEIGHT: 67 IN | BODY MASS INDEX: 35.47 KG/M2 | RESPIRATION RATE: 16 BRPM | OXYGEN SATURATION: 97 % | TEMPERATURE: 99 F | DIASTOLIC BLOOD PRESSURE: 76 MMHG | WEIGHT: 226 LBS | HEART RATE: 87 BPM

## 2025-03-07 DIAGNOSIS — K57.32 SIGMOID DIVERTICULITIS: Primary | ICD-10-CM

## 2025-03-07 DIAGNOSIS — R31.29 MICROSCOPIC HEMATURIA: ICD-10-CM

## 2025-03-07 DIAGNOSIS — E87.6 HYPOKALEMIA: ICD-10-CM

## 2025-03-07 DIAGNOSIS — Z90.5 SOLITARY KIDNEY, ACQUIRED: ICD-10-CM

## 2025-03-07 DIAGNOSIS — R10.32 LEFT LOWER QUADRANT ABDOMINAL PAIN: ICD-10-CM

## 2025-03-07 DIAGNOSIS — R10.32 LEFT LOWER QUADRANT ABDOMINAL PAIN: Primary | ICD-10-CM

## 2025-03-07 LAB
ALBUMIN SERPL BCP-MCNC: 4 G/DL (ref 3.5–5.2)
ALP SERPL-CCNC: 71 U/L (ref 55–135)
ALT SERPL W/O P-5'-P-CCNC: 7 U/L (ref 10–44)
ANION GAP SERPL CALC-SCNC: 7 MMOL/L (ref 8–16)
AST SERPL-CCNC: 10 U/L (ref 10–40)
BASOPHILS # BLD AUTO: 0.04 K/UL (ref 0–0.2)
BASOPHILS NFR BLD: 0.4 % (ref 0–1.9)
BILIRUB SERPL-MCNC: 0.5 MG/DL (ref 0.1–1)
BILIRUB UR QL STRIP: NEGATIVE
BILIRUB UR QL STRIP: POSITIVE
BUN SERPL-MCNC: 10 MG/DL (ref 6–20)
CALCIUM SERPL-MCNC: 9.1 MG/DL (ref 8.7–10.5)
CHLORIDE SERPL-SCNC: 99 MMOL/L (ref 95–110)
CLARITY UR: CLEAR
CO2 SERPL-SCNC: 29 MMOL/L (ref 23–29)
COLOR UR: YELLOW
CREAT SERPL-MCNC: 1.1 MG/DL (ref 0.5–1.4)
DIFFERENTIAL METHOD BLD: ABNORMAL
EOSINOPHIL # BLD AUTO: 0 K/UL (ref 0–0.5)
EOSINOPHIL NFR BLD: 0.4 % (ref 0–8)
ERYTHROCYTE [DISTWIDTH] IN BLOOD BY AUTOMATED COUNT: 14.1 % (ref 11.5–14.5)
EST. GFR  (NO RACE VARIABLE): 57.5 ML/MIN/1.73 M^2
GLUCOSE SERPL-MCNC: 98 MG/DL (ref 70–110)
GLUCOSE UR QL STRIP: NEGATIVE
GLUCOSE UR QL STRIP: NEGATIVE
HCT VFR BLD AUTO: 37.8 % (ref 37–48.5)
HGB BLD-MCNC: 12.4 G/DL (ref 12–16)
HGB UR QL STRIP: ABNORMAL
IMM GRANULOCYTES # BLD AUTO: 0.05 K/UL (ref 0–0.04)
IMM GRANULOCYTES NFR BLD AUTO: 0.6 % (ref 0–0.5)
KETONES UR QL STRIP: NEGATIVE
KETONES UR QL STRIP: NEGATIVE
LEUKOCYTE ESTERASE UR QL STRIP: NEGATIVE
LEUKOCYTE ESTERASE UR QL STRIP: NEGATIVE
LIPASE SERPL-CCNC: 13 U/L (ref 4–60)
LYMPHOCYTES # BLD AUTO: 3.1 K/UL (ref 1–4.8)
LYMPHOCYTES NFR BLD: 34.1 % (ref 18–48)
MCH RBC QN AUTO: 29.2 PG (ref 27–31)
MCHC RBC AUTO-ENTMCNC: 32.8 G/DL (ref 32–36)
MCV RBC AUTO: 89 FL (ref 82–98)
MICROSCOPIC COMMENT: NORMAL
MONOCYTES # BLD AUTO: 0.5 K/UL (ref 0.3–1)
MONOCYTES NFR BLD: 5.9 % (ref 4–15)
NEUTROPHILS # BLD AUTO: 5.3 K/UL (ref 1.8–7.7)
NEUTROPHILS NFR BLD: 58.6 % (ref 38–73)
NITRITE UR QL STRIP: NEGATIVE
NRBC BLD-RTO: 0 /100 WBC
PH UR STRIP: 6 [PH] (ref 5–8)
PH, POC UA: 6
PLATELET # BLD AUTO: 294 K/UL (ref 150–450)
PMV BLD AUTO: 9.6 FL (ref 9.2–12.9)
POC BLOOD, URINE: POSITIVE
POC NITRATES, URINE: NEGATIVE
POTASSIUM SERPL-SCNC: 3.1 MMOL/L (ref 3.5–5.1)
PROT SERPL-MCNC: 7.5 G/DL (ref 6–8.4)
PROT UR QL STRIP: NEGATIVE
PROT UR QL STRIP: NEGATIVE
RBC # BLD AUTO: 4.25 M/UL (ref 4–5.4)
RBC #/AREA URNS HPF: 4 /HPF (ref 0–4)
SODIUM SERPL-SCNC: 135 MMOL/L (ref 136–145)
SP GR UR STRIP: 1.02 (ref 1–1.03)
SP GR UR STRIP: >1.03 (ref 1–1.03)
SQUAMOUS #/AREA URNS HPF: 6 /HPF
URN SPEC COLLECT METH UR: ABNORMAL
UROBILINOGEN UR STRIP-ACNC: NEGATIVE (ref 0.1–1.1)
UROBILINOGEN UR STRIP-ACNC: NEGATIVE EU/DL
WBC # BLD AUTO: 9.03 K/UL (ref 3.9–12.7)
WBC #/AREA URNS HPF: 1 /HPF (ref 0–5)

## 2025-03-07 PROCEDURE — 63600175 PHARM REV CODE 636 W HCPCS

## 2025-03-07 PROCEDURE — 25500020 PHARM REV CODE 255

## 2025-03-07 PROCEDURE — 25000003 PHARM REV CODE 250

## 2025-03-07 PROCEDURE — 83690 ASSAY OF LIPASE: CPT

## 2025-03-07 PROCEDURE — 99285 EMERGENCY DEPT VISIT HI MDM: CPT | Mod: 25

## 2025-03-07 PROCEDURE — 80053 COMPREHEN METABOLIC PANEL: CPT

## 2025-03-07 PROCEDURE — 81001 URINALYSIS AUTO W/SCOPE: CPT

## 2025-03-07 PROCEDURE — 96361 HYDRATE IV INFUSION ADD-ON: CPT

## 2025-03-07 PROCEDURE — 85025 COMPLETE CBC W/AUTO DIFF WBC: CPT

## 2025-03-07 PROCEDURE — 98005 SYNCH AUDIO-VIDEO EST LOW 20: CPT | Mod: 95,,, | Performed by: NURSE PRACTITIONER

## 2025-03-07 PROCEDURE — 96360 HYDRATION IV INFUSION INIT: CPT

## 2025-03-07 RX ORDER — ONDANSETRON 4 MG/1
4 TABLET, ORALLY DISINTEGRATING ORAL EVERY 6 HOURS PRN
Qty: 15 TABLET | Refills: 0 | Status: SHIPPED | OUTPATIENT
Start: 2025-03-07

## 2025-03-07 RX ORDER — TRAMADOL HYDROCHLORIDE 50 MG/1
50 TABLET ORAL
Status: COMPLETED | OUTPATIENT
Start: 2025-03-07 | End: 2025-03-07

## 2025-03-07 RX ORDER — AMOXICILLIN AND CLAVULANATE POTASSIUM 875; 125 MG/1; MG/1
1 TABLET, FILM COATED ORAL EVERY 8 HOURS
Qty: 21 TABLET | Refills: 0 | Status: SHIPPED | OUTPATIENT
Start: 2025-03-07 | End: 2025-03-14

## 2025-03-07 RX ORDER — MORPHINE SULFATE 4 MG/ML
4 INJECTION, SOLUTION INTRAMUSCULAR; INTRAVENOUS
Refills: 0 | Status: DISCONTINUED | OUTPATIENT
Start: 2025-03-07 | End: 2025-03-07

## 2025-03-07 RX ORDER — AMOXICILLIN AND CLAVULANATE POTASSIUM 875; 125 MG/1; MG/1
1 TABLET, FILM COATED ORAL
Status: COMPLETED | OUTPATIENT
Start: 2025-03-07 | End: 2025-03-07

## 2025-03-07 RX ORDER — TRAMADOL HYDROCHLORIDE 50 MG/1
50 TABLET ORAL EVERY 6 HOURS PRN
Qty: 12 TABLET | Refills: 0 | Status: SHIPPED | OUTPATIENT
Start: 2025-03-07

## 2025-03-07 RX ADMIN — IOHEXOL 100 ML: 350 INJECTION, SOLUTION INTRAVENOUS at 07:03

## 2025-03-07 RX ADMIN — AMOXICILLIN AND CLAVULANATE POTASSIUM 1 TABLET: 875; 125 TABLET, FILM COATED ORAL at 09:03

## 2025-03-07 RX ADMIN — SODIUM CHLORIDE 1000 ML: 9 INJECTION, SOLUTION INTRAVENOUS at 06:03

## 2025-03-07 RX ADMIN — TRAMADOL HYDROCHLORIDE 50 MG: 50 TABLET, COATED ORAL at 09:03

## 2025-03-07 RX ADMIN — POTASSIUM BICARBONATE 40 MEQ: 782 TABLET, EFFERVESCENT ORAL at 07:03

## 2025-03-07 NOTE — PROGRESS NOTES
"Subjective:      Patient ID: Sloane Dimas is a 60 y.o. female.    Vitals:  height is 5' 7" (1.702 m) and weight is 102.5 kg (226 lb). Her oral temperature is 98.9 °F (37.2 °C). Her blood pressure is 112/76 and her pulse is 87. Her respiration is 16 and oxygen saturation is 97%.     Chief Complaint: Abdominal Pain    Pt. Stated she has a deep abdominal pain to her left side and it's worse with urine in her bladder that hurts until it's out.     Patient reports that she return [place] feeling fine and began that evening feeling run down.  Slept all day Wednesday with some sneezing and headache. Started with left lower abd pain, dull, deep, constant and worsened with voiding and significantly worsened during BM.  Had telehealth visit today with advise to have eval and UA.     Abdominal Pain  This is a new problem. Episode onset: 3/5. Associated symptoms include headaches. Pertinent negatives include no constipation, diarrhea, dysuria, fever, frequency, hematochezia, hematuria, nausea or vomiting.       Constitution: Positive for appetite change and fatigue. Negative for fever.   HENT:  Negative for sore throat.    Cardiovascular:  Negative for chest pain.   Respiratory:  Negative for chest tightness, cough, shortness of breath, wheezing and asthma.    Gastrointestinal:  Positive for abdominal pain (Left lower abdomen worsened during voiding and during BM). Negative for abdominal bloating, nausea, vomiting, constipation, diarrhea, bright red blood in stool and dark colored stools.   Genitourinary:  Negative for dysuria, frequency, urgency, flank pain and hematuria.   Musculoskeletal:  Negative for back pain.   Skin:  Negative for rash.   Allergic/Immunologic: Positive for sneezing. Negative for asthma.   Neurological:  Positive for headaches.      Objective:     Physical Exam   Constitutional: She is oriented to person, place, and time.  Non-toxic appearance. She does not appear " ill. No distress.   HENT:   Head: Normocephalic and atraumatic.   Nose: Nose normal.   Mouth/Throat: Mucous membranes are moist.   Eyes: Conjunctivae are normal.   Cardiovascular: Normal rate and regular rhythm.   Pulmonary/Chest: Effort normal. No respiratory distress.   Abdominal: Normal appearance. She exhibits no distension and no mass. Soft. flat abdomen There is abdominal tenderness in the left lower quadrant. There is right CVA tenderness. There is no rebound, no guarding and no left CVA tenderness. No hernia.      Comments: Hx of total hysterectomy.  Denies hx of diverticular disease. Hx of solitary kidney due to donation.   Neurological: no focal deficit. She is alert and oriented to person, place, and time.   Skin: Skin is warm, dry and not diaphoretic. Capillary refill takes 2 to 3 seconds.   Psychiatric: Her behavior is normal. Mood normal.   Nursing note and vitals reviewed.      Assessment:     1. Left lower quadrant abdominal pain    2. Microscopic hematuria    3. Solitary kidney, acquired      UA: 200 rbc's, otherwise neg  Urine culture pending      Plan:       Left lower quadrant abdominal pain  -     POCT Urinalysis, Dipstick, Manual, W/O Scope  -     Urine Culture High Risk    Microscopic hematuria    Solitary kidney, acquired                Advised patient to go to ER for further evaluation and management of current symptoms status scope of care that can be provided in urgent care setting.  Patient verbalizes understanding and is in agreement for further evaluation of current symptoms.  Patient reports to the fact that she only has 1 kidney due to donation that she is not willing to take any chances and desires full evaluation that is unable to be provided to her in an urgent care setting.  Declined EMS transfer, form signed, see MM

## 2025-03-07 NOTE — ED PROVIDER NOTES
Encounter Date: 3/7/2025       History     Chief Complaint   Patient presents with    Pelvic Pain     Pt states she has been having pelvic pain and urinary frequency x 2 days     60-year-old female with a past medical history migraines, anemia, total hysterectomy, and a right nephrectomy presents to the ED for abdominal pain.  Patient states this started on Wednesday.  Patient complaining of left lower quadrant aching constant 6/10 pain.  No medications prior to arrival.  Lying down, full bladder, and attempting bowel movements makes it worse.  Patient denies ever feeling like this before.  No history diverticulitis.  Last bowel movement was yesterday.  Patient also admits to chills, urinary frequency, urinary urgency, and headache.  Denies fever, sweats, shortness breath, chest pain, nausea, vomiting, diarrhea, constipation, dysuria, hematuria, vaginal pain, vaginal bleeding, vaginal discharge, rash, dizziness, and lightheadedness.      Review of patient's allergies indicates:   Allergen Reactions    Other      NARCOTICS MAKE ITCH, RESP DEPRESSION, EMOTIONAL    Hay fever and allergy relief Other (See Comments)     Past Medical History:   Diagnosis Date    Anemia     Blood transfusion     Migraine     Positive PPD      DX- CXR OK- STARTED INH     Wears glasses     CONTACS     Past Surgical History:   Procedure Laterality Date    BREAST LUMPECTOMY      x 6 all benign     SECTION, CLASSIC      x 2    HEMORRHOID SURGERY      HYSTERECTOMY      2012    KIDNEY SURGERY      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      TUBAL LIGATION       Family History   Problem Relation Name Age of Onset    Lung cancer Mother      Breast cancer Mother      Hypertension Mother      Breast cancer Maternal Aunt      Cancer Maternal Aunt          breast triple negative    Kidney disease Brother       Social History[1]  Review of Systems   Constitutional:  Positive for chills. Negative for diaphoresis and fever.   Respiratory:   Negative for shortness of breath.    Cardiovascular:  Negative for chest pain.   Gastrointestinal:  Positive for abdominal pain (LLQ). Negative for constipation, diarrhea, nausea and vomiting.   Genitourinary:  Positive for frequency and urgency. Negative for decreased urine volume, difficulty urinating, dysuria, hematuria, vaginal bleeding, vaginal discharge and vaginal pain.   Musculoskeletal:  Negative for myalgias.   Skin:  Negative for rash.   Neurological:  Positive for headaches. Negative for dizziness, weakness and light-headedness.       Physical Exam     Initial Vitals [03/07/25 1704]   BP Pulse Resp Temp SpO2   111/79 86 16 98.4 °F (36.9 °C) 98 %      MAP       --         Physical Exam    Nursing note and vitals reviewed.  Constitutional: She appears well-developed and well-nourished. She is not diaphoretic. She is active. She does not appear ill. No distress.   HENT:   Head: Normocephalic and atraumatic.   Right Ear: External ear normal.   Left Ear: External ear normal.   Nose: Nose normal.   Eyes: Conjunctivae, EOM and lids are normal. Pupils are equal, round, and reactive to light. Right eye exhibits no discharge. Left eye exhibits no discharge.   Neck: Phonation normal. Neck supple.   Normal range of motion.  Pulmonary/Chest: Effort normal. No respiratory distress.   Abdominal: Abdomen is soft. She exhibits no distension and no mass. There is abdominal tenderness in the left lower quadrant.   No right CVA tenderness.  No left CVA tenderness. There is no rebound, no guarding and no tenderness at McBurney's point. negative Rovsing's sign  Musculoskeletal:         General: Normal range of motion.      Cervical back: Normal range of motion and neck supple.     Neurological: She is alert and oriented to person, place, and time. GCS eye subscore is 4. GCS verbal subscore is 5. GCS motor subscore is 6.   Skin: Skin is dry. Capillary refill takes less than 2 seconds. No rash noted.         ED Course    Procedures  Labs Reviewed   CBC W/ AUTO DIFFERENTIAL - Abnormal       Result Value    WBC 9.03      RBC 4.25      Hemoglobin 12.4      Hematocrit 37.8      MCV 89      MCH 29.2      MCHC 32.8      RDW 14.1      Platelets 294      MPV 9.6      Immature Granulocytes 0.6 (*)     Gran # (ANC) 5.3      Immature Grans (Abs) 0.05 (*)     Lymph # 3.1      Mono # 0.5      Eos # 0.0      Baso # 0.04      nRBC 0      Gran % 58.6      Lymph % 34.1      Mono % 5.9      Eosinophil % 0.4      Basophil % 0.4      Differential Method Automated     COMPREHENSIVE METABOLIC PANEL - Abnormal    Sodium 135 (*)     Potassium 3.1 (*)     Chloride 99      CO2 29      Glucose 98      BUN 10      Creatinine 1.1      Calcium 9.1      Total Protein 7.5      Albumin 4.0      Total Bilirubin 0.5      Alkaline Phosphatase 71      AST 10      ALT 7 (*)     eGFR 57.5 (*)     Anion Gap 7 (*)    URINALYSIS, REFLEX TO URINE CULTURE - Abnormal    Specimen UA Urine, Clean Catch      Color, UA Yellow      Appearance, UA Clear      pH, UA 6.0      Specific Gravity, UA >1.030 (*)     Protein, UA Negative      Glucose, UA Negative      Ketones, UA Negative      Bilirubin (UA) Negative      Occult Blood UA 1+ (*)     Nitrite, UA Negative      Urobilinogen, UA Negative      Leukocytes, UA Negative     LIPASE    Lipase 13     URINALYSIS MICROSCOPIC    RBC, UA 4      WBC, UA 1      Squam Epithel, UA 6      Microscopic Comment SEE COMMENT       EKG Readings: (Independently Interpreted)   EKG showed normal sinus rhythm with 78 bpm. CT interval 166 ms. QRS 90 ms.  ms. No stemi noted. Signed by Dr. Chua.       Imaging Results               CT Abdomen Pelvis With IV Contrast NO Oral Contrast (Final result)  Result time 03/07/25 20:37:13      Final result by Tim Ram MD (03/07/25 20:37:13)                   Impression:      CT findings suggestive of acute sigmoid diverticulitis.  No evidence of abscess or free intraperitoneal air.  Recommend  colonoscopy follow-up when clinically appropriate to exclude underlying mass.    Hepatomegaly and hepatic steatosis.  2.2 cm heterogeneously enhancing right hepatic lobe lesion may reflect a hepatic hemangioma, although is indeterminate and incompletely evaluated on this single-phase examination.  Given underlying hepatic steatosis, consider nonemergent MRI follow-up.    Right nephrectomy.    Additional findings as above.    This report was flagged in Epic as abnormal.      Electronically signed by: Tim Ram MD  Date:    03/07/2025  Time:    20:37               Narrative:    EXAMINATION:  CT ABDOMEN PELVIS WITH IV CONTRAST    CLINICAL HISTORY:  LLQ abdominal pain;    TECHNIQUE:  Low dose axial images, sagittal and coronal reformations were obtained from the lung bases to the pubic symphysis following the IV administration of 100 mL of Omnipaque 350 .  Oral contrast was not given.    COMPARISON:  None.    FINDINGS:  There is mild atelectasis or scarring at the lung bases.  No significant pleural fluid.  The visualized portions of the heart appear normal.    The liver is mildly enlarged.  There is diffuse decreased attenuation of the hepatic parenchyma suggesting hepatic steatosis.  There is a 2.2 cm heterogeneous enhancing right hepatic lobe lesion (axial series 3, image 53).  Suspected region of geographic altered/transient perfusion identified within the more inferior right hepatic lobe.  No definite radiopaque calculi identified in the gallbladder lumen.  No significant intra or extrahepatic biliary ductal dilatation.    Stomach appears within normal limits.  There is a small duodenum diverticulum.  The spleen is mildly enlarged.  The pancreas demonstrates no significant peripancreatic inflammatory change.  The adrenal glands appear within normal limits.    The right kidney is absent.  There is mild compensatory hypertrophy of the left kidney.  No significant hydronephrosis.  There is mild urinary bladder  wall thickening likely relating to nondistention.  Uterus appears to be surgically absent.    The abdominal aorta is normal in course and caliber without significant atherosclerotic calcifications.    No evidence to suggest acute small bowel obstruction.  There is colonic diverticulosis.  There is a segment of sigmoid colon demonstrating wall thickening and pericolonic inflammatory change in a region of diverticula suggesting acute diverticulitis.  No discrete drainable abscess identified. No CT evidence of acute appendicitis.  There is no ascites, free fluid, or intraperitoneal free air. There are scattered shotty small mesenteric and retroperitoneal lymph nodes. There is a small fat containing umbilical hernia.    Osseous structures demonstrate mild degenerative change.  The extraperitoneal soft tissues are unremarkable.                                       Medications   amoxicillin-clavulanate 875-125mg per tablet 1 tablet (has no administration in time range)   traMADoL tablet 50 mg (has no administration in time range)   sodium chloride 0.9% bolus 1,000 mL 1,000 mL (1,000 mLs Intravenous New Bag 3/7/25 1823)   potassium bicarbonate disintegrating tablet 40 mEq (40 mEq Oral Given 3/7/25 1948)   iohexoL (OMNIPAQUE 350) injection 100 mL (100 mLs Intravenous Given 3/7/25 1920)     Medical Decision Making  60-year-old female with a past medical history migraines, anemia, total hysterectomy, and a right nephrectomy presents to the ED for abdominal pain.   Patient's chart and medical history reviewed.    Ddx:  Constipation  Diverticulitis   SBO  Colitis  Mesenteric ischemia  Appendicitis  UTI    Patient's vitals reviewed.  Afebrile, no respiratory distress, and nontoxic-appearing in the ED. patient had left lower quadrant tenderness palpation with no guarding or rebound tenderness.  Patient started on fluids and given morphine for pain. CBC overall unremarkable.  Lipase in normal range, pancreatitis unlikely.  CMP  showed mild hypokalemia of 3.1 and a GFR 57.5.  Normal kidney function.  Patient given 40 mEq of potassium bicarb in the ED and we will get EKG. EKG showed normal sinus rhythm with 78 bpm. VT interval 166 ms. QRS 90 ms.  ms. No stemi noted. Signed by Dr. Chua. UA unremarkable for infection. CT abd showed CT findings suggestive of acute sigmoid diverticulitis.  No evidence of abscess or free intraperitoneal air.  Recommend colonoscopy follow-up when clinically appropriate to exclude underlying mass. Hepatomegaly and hepatic steatosis.  2.2 cm heterogeneously enhancing right hepatic lobe lesion may reflect a hepatic hemangioma, although is indeterminate and incompletely evaluated on this single-phase examination.  Given underlying hepatic steatosis, consider nonemergent MRI follow-up. Right nephrectomy.  Discussed this case with Dr. Chua.  Discussed all results with patient including acute diverticulitis and mild hypokalemia, she verbalized understanding.  Patient tolerated p.o. challenge, stable vital signs, overall well-appearing she is stable for outpatient therapy.  Patient given 1st dose of Augmentin in the ED. referral sent to GI for further management.  Instructed patient to rest, stay well hydrated, initiate a soft bowel diet, she verbalized understanding.  Diverticulitis diet discussed, she verbalized understanding.  Patient follow-up with GI and her PCP.  Patient is sent home with Zofran and tramadol for symptomatic control.  Patient is sent home on Augmentin for acute diverticulitis. Patient agrees with this plan. Discussed with her strict return precautions, she verbalized understanding. Patient is stable for discharge.     Amount and/or Complexity of Data Reviewed  External Data Reviewed: labs, radiology and notes.  Labs: ordered.  Radiology: ordered.  ECG/medicine tests: ordered.    Risk  Prescription drug management.                                      Clinical Impression:  Final  diagnoses:  [E87.6] Hypokalemia  [R10.32] Left lower quadrant abdominal pain  [K57.32] Sigmoid diverticulitis (Primary)          ED Disposition Condition    Discharge Stable          ED Prescriptions       Medication Sig Dispense Start Date End Date Auth. Provider    amoxicillin-clavulanate 875-125mg (AUGMENTIN) 875-125 mg per tablet Take 1 tablet by mouth every 8 (eight) hours. for 7 days 21 tablet 3/7/2025 3/14/2025 Holdsworth, Alayna, PA-C    ondansetron (ZOFRAN-ODT) 4 MG TbDL Take 1 tablet (4 mg total) by mouth every 6 (six) hours as needed (nausea). 15 tablet 3/7/2025 -- Holdsworth, Alayna, PA-C    traMADoL (ULTRAM) 50 mg tablet Take 1 tablet (50 mg total) by mouth every 6 (six) hours as needed for Pain. 12 tablet 3/7/2025 -- Holdsworth, Alayna, PA-C          Follow-up Information       Follow up With Specialties Details Why Contact Info    Samantha Justice MD Family Medicine Call   [address] [facility]  [address]  [facility] [place] [id number] [phone]                   [1]   Social History  Tobacco Use    Smoking status: Never    Smokeless tobacco: Never   Substance Use Topics    Alcohol use: Yes     Comment: VERY RARELY    Drug use: No        Holdsworth, Alayna, PA-C  03/07/25 6344

## 2025-03-08 LAB
OHS QRS DURATION: 90 MS
OHS QTC CALCULATION: 462 MS

## 2025-03-08 NOTE — DISCHARGE INSTRUCTIONS

## 2025-03-10 ENCOUNTER — RESULTS FOLLOW-UP (OUTPATIENT)
Dept: PULMONOLOGY | Facility: CLINIC | Age: 60
End: 2025-03-10

## 2025-03-12 ENCOUNTER — RESULTS FOLLOW-UP (OUTPATIENT)
Dept: URGENT CARE | Facility: CLINIC | Age: 60
End: 2025-03-12

## 2025-03-13 ENCOUNTER — TELEPHONE (OUTPATIENT)
Dept: PULMONOLOGY | Facility: CLINIC | Age: 60
End: 2025-03-13
Payer: OTHER GOVERNMENT

## 2025-03-13 DIAGNOSIS — D18.03 HEPATIC HEMANGIOMA: ICD-10-CM

## 2025-03-13 DIAGNOSIS — E73.9 LACTOSE INTOLERANCE: ICD-10-CM

## 2025-03-13 DIAGNOSIS — R15.9 FECAL INCONTINENCE: ICD-10-CM

## 2025-03-13 DIAGNOSIS — K62.1 RECTAL POLYP: ICD-10-CM

## 2025-03-13 DIAGNOSIS — M62.89 PELVIC FLOOR DYSFUNCTION: ICD-10-CM

## 2025-03-13 DIAGNOSIS — K63.8219 SMALL INTESTINAL BACTERIAL OVERGROWTH: ICD-10-CM

## 2025-03-13 DIAGNOSIS — K57.32 DIVERTICULITIS LARGE INTESTINE: Primary | ICD-10-CM

## 2025-03-13 NOTE — TELEPHONE ENCOUNTER
----- Message from Germán sent at 3/13/2025  1:47 PM CDT -----  Type:  Patient Returning CallWho Called: ptWho Left Message for Patient:AmandaDoes the patient know what this is regarding?:Practice wanted to reschedule the 20th appt.Would the patient rather a call back or a response via [facility]? Call back or portal is [facility] Call Back Number: [phone] Information: Calling back, pt was in an appt earlier today when she missed the call to reschedule the appt on 20th.    Please call back to advise. Thanks!

## 2025-03-13 NOTE — TELEPHONE ENCOUNTER
----- Message from Sarah sent at 3/13/2025  9:45 AM CDT -----  Type:  Patient Returning CallWho Called:pt Who Left Message for Patient:ada Does the patient know what this is regarding?:appt Would the patient rather a call back or a response via [facility]? Call back [facility] Call Back Number:[phone] Additional Information:   Please call back to advise. Thank you.   Reason for visit: cysto + STUDY          Dx/Hx/Sx: bladder cancer      Records/imaging/labs/orders: in epic      At Rooming: have graspers ready - have yellow bag available in red bin - bio hazard bin for graspers- tall/ long gloves- blue gown- face shields - ask pt if he would like lido(mostly yes)

## 2025-03-24 ENCOUNTER — HOSPITAL ENCOUNTER (OUTPATIENT)
Dept: RADIOLOGY | Facility: HOSPITAL | Age: 60
Discharge: HOME | End: 2025-03-24
Attending: INTERNAL MEDICINE
Payer: OTHER GOVERNMENT

## 2025-03-24 DIAGNOSIS — R15.9 FECAL INCONTINENCE: ICD-10-CM

## 2025-03-24 DIAGNOSIS — K57.32 DIVERTICULITIS LARGE INTESTINE: ICD-10-CM

## 2025-03-24 DIAGNOSIS — D18.03 HEPATIC HEMANGIOMA: ICD-10-CM

## 2025-03-24 DIAGNOSIS — K63.8219 SMALL INTESTINAL BACTERIAL OVERGROWTH: ICD-10-CM

## 2025-03-24 DIAGNOSIS — M62.89 PELVIC FLOOR DYSFUNCTION: ICD-10-CM

## 2025-03-24 DIAGNOSIS — K62.1 RECTAL POLYP: ICD-10-CM

## 2025-03-24 DIAGNOSIS — E73.9 LACTOSE INTOLERANCE: ICD-10-CM

## 2025-03-24 PROCEDURE — A9585 GADOBUTROL INJECTION: HCPCS

## 2025-03-24 PROCEDURE — 74183 MRI ABD W/O CNTR FLWD CNTR: CPT | Mod: TC

## 2025-03-24 PROCEDURE — 74183 MRI ABD W/O CNTR FLWD CNTR: CPT | Mod: 26,,, | Performed by: RADIOLOGY

## 2025-03-24 PROCEDURE — 25500020 PHARM REV CODE 255

## 2025-03-24 RX ORDER — GADOBUTROL 604.72 MG/ML
INJECTION INTRAVENOUS
Status: COMPLETED
Start: 2025-03-24 | End: 2025-03-24

## 2025-03-24 RX ADMIN — GADOBUTROL 10 ML: 604.72 INJECTION INTRAVENOUS at 07:03

## 2025-04-08 ENCOUNTER — OFFICE VISIT (OUTPATIENT)
Dept: PULMONOLOGY | Facility: CLINIC | Age: 60
End: 2025-04-08
Payer: OTHER GOVERNMENT

## 2025-04-08 DIAGNOSIS — G47.30 SLEEP APNEA, UNSPECIFIED TYPE: Primary | ICD-10-CM

## 2025-04-08 DIAGNOSIS — J45.20 MILD INTERMITTENT ASTHMA WITHOUT COMPLICATION: ICD-10-CM

## 2025-04-08 PROCEDURE — 98005 SYNCH AUDIO-VIDEO EST LOW 20: CPT | Mod: 95,,, | Performed by: NURSE PRACTITIONER

## 2025-04-08 RX ORDER — PREDNISONE 10 MG/1
TABLET ORAL
Qty: 12 TABLET | Refills: 0 | Status: SHIPPED | OUTPATIENT
Start: 2025-04-08

## 2025-04-08 RX ORDER — ALBUTEROL SULFATE AND BUDESONIDE 90; 80 UG/1; UG/1
2 AEROSOL, METERED RESPIRATORY (INHALATION) EVERY 4 HOURS PRN
Qty: 10.7 G | Refills: 11 | Status: SHIPPED | OUTPATIENT
Start: 2025-04-08

## 2025-04-08 NOTE — PROGRESS NOTES
4/8/2025    Sloane Dimas  The patient location is: home, [facility]  The chief complaint leading to consultation is: fatigue and chest tightness    Visit type: audiovisual    Face to Face time with patient: 20 minutes  22 minutes of total time spent on the encounter, which includes face to face time and non-face to face time preparing to see the patient (eg, review of tests), Obtaining and/or reviewing separately obtained history, Documenting clinical information in the electronic or other health record, Independently interpreting results (not separately reported) and communicating results to the patient/family/caregiver, or Care coordination (not separately reported).         Each patient to whom he or she provides medical services by telemedicine is:  (1) informed of the relationship between the physician and patient and the respective role of any other health care provider with respect to management of the patient; and (2) notified that he or she may decline to receive medical services by telemedicine and may withdraw from such care at any time.    Notes:       Chief Complaint   Patient presents with    Sleep Apnea       HPI:   4/8/25- 2/27/25 home sleep study shows AHI 4.6; interested in polysomnogram testing   Complaint of daytime fatigue, mental grogginess, short term memory loss, and hip pain when waking.   Recently dx diverticulitis, treated with clear liquid diet and antibiotics.     Complaint of feeling chest tightness when eating certain foods. Occurs at random, onset years, varies in severity.     12/17/2024- referred by PCP for complaint of daytime fatigue,  states she snores nightly, EPWORTH 12  Associated with waking up feeling tired, body aches, mental grogginess.   Complaint of shortness seasonally, has to sleep with head propped up. Associated with non productive cough, improves on its own after 2 weeks.     Social Hx: lives with family, no pets, currently in pharmaceuticals  sales, no Asbestosis exposure, no Smoking Hx  Family Hx: Mother Lung Cancer ( 71 age) and breast cancer, no COPD, no Asthma, Sister NALDO  Medical Hx: no previous pneumonia ; previous shoulder 2012/ no chest surgery      The chief compliant  problem is persistent complaint  PFSH:  Past Medical History:   Diagnosis Date    Anemia     Blood transfusion     Migraine     Positive PPD      DX- CXR OK- STARTED INH     Wears glasses     CONTACS         Past Surgical History:   Procedure Laterality Date    BREAST LUMPECTOMY      x 6 all benign     SECTION, CLASSIC      x 2    HEMORRHOID SURGERY      HYSTERECTOMY      2012    KIDNEY SURGERY      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  2006    TUBAL LIGATION       Social History     Tobacco Use    Smoking status: Never    Smokeless tobacco: Never   Substance Use Topics    Alcohol use: Yes     Comment: VERY RARELY    Drug use: No     Family History   Problem Relation Name Age of Onset    Lung cancer Mother      Breast cancer Mother      Hypertension Mother      Breast cancer Maternal Aunt      Cancer Maternal Aunt          breast triple negative    Kidney disease Brother       Review of patient's allergies indicates:   Allergen Reactions    Other      NARCOTICS MAKE ITCH, RESP DEPRESSION, EMOTIONAL    Hay fever and allergy relief Other (See Comments)     I have reviewed past medical, family, and social history. I have reviewed previous nurse notes.        Performance Status:The patient's activity level is no limits with regular activity.          Review of Systems:  a review of eleven systems covering constitutional, Eye, HEENT, Psych, Respiratory, Cardiac, GI, , Musculoskeletal, Endocrine, Dermatologic was negative except for pertinent findings as listed ABOVE and below: pertinent positive as above, rest is good  Fatigue  Shortness of breath  Mental grogginess  Chest tightness         Exam:Comprehensive exam done. LMP 2012     Constitutional:  oriented to  person, place, and time.  appears well-developed. No distress.   Nose: Nose normal.     Eyes: Pupils are equal, round,   Neck: No JVD present. No thyromegaly visible     Pulmonary/Chest: Effort normal and audible breath sounds normal. No accessory muscle usage or stridor. No apnea and no tachypnea. No respiratory distress,     Skin: tone/color normal. No palor    Psychiatric: normal mood and affect. behavior is normal. Judgment and thought content normal.         Radiographs (ct chest and cxr) reviewed: none available for review in [facility] Everywhere  patient imaging studies reviewed and interpreted independently. My personal interpretation of most resent images include:      CT ABDOMEN PELVIS WITH IV CONTRAST 3/7/2025 lower bilateral visible lung lobes clear with minimal bilateral atelectasis       Labs: Patients labs reviewed including CBC and C02  reviewed       Lab Results   Component Value Date    WBC 9.03 03/07/2025    RBC 4.25 03/07/2025    HGB 12.4 03/07/2025    HCT 37.8 03/07/2025    MCV 89 03/07/2025    MCH 29.2 03/07/2025    MCHC 32.8 03/07/2025    RDW 14.1 03/07/2025     03/07/2025    MPV 9.6 03/07/2025    GRAN 5.3 03/07/2025    GRAN 58.6 03/07/2025    LYMPH 3.1 03/07/2025    LYMPH 34.1 03/07/2025    MONO 0.5 03/07/2025    MONO 5.9 03/07/2025    EOS 0.0 03/07/2025    BASO 0.04 03/07/2025    EOSINOPHIL 0.4 03/07/2025    BASOPHIL 0.4 03/07/2025      Latest Reference Range & Units 02/06/23 12:46 12/12/23 12:48 10/23/24 10:55   CO2 20 - 32 mmol/L 29 27 30         Plan:  Clinical impression is apparently straight forward and impression with management as below.    Sloane was seen today for sleep apnea.    Diagnoses and all orders for this visit:    Sleep apnea, unspecified type  -     Polysomnogram (CPAP will be added if patient meets diagnostic criteria.); Future    Mild intermittent asthma without complication  -     AIRSUPRA 90-80 mcg/actuation; Inhale 2 puffs into the lungs every 4 (four)  hours as needed (cough). Coupon code: BIN [id number]; PCN [id number]; GRP [id number]; ID [id number]  -     predniSONE (DELTASONE) 10 MG tablet; Take one pill a day for three days, repeat for shortness of breath        Follow up in about 4 months (around 8/8/2025).        Discussed with patient above for education the following:      Patient Instructions   Airsupra 2 puffs every 4 hours as needed for chest tightness or cough up to 6 times a day (12 puffs total)  rinse mouth after using due to risk for thrush if mouth or tongue has white sores contact clinic    Ordering polysomnogram. Expect phone call from [facility] to schedule

## 2025-04-08 NOTE — PATIENT INSTRUCTIONS
Airsupra 2 puffs every 4 hours as needed for chest tightness or cough up to 6 times a day (12 puffs total)  rinse mouth after using due to risk for thrush if mouth or tongue has white sores contact clinic    Ordering polysomnogram. Expect phone call from [facility] to schedule

## 2025-04-23 ENCOUNTER — PROCEDURE VISIT (OUTPATIENT)
Dept: SLEEP MEDICINE | Facility: HOSPITAL | Age: 60
End: 2025-04-23
Attending: NURSE PRACTITIONER
Payer: OTHER GOVERNMENT

## 2025-04-23 DIAGNOSIS — G47.30 SLEEP APNEA, UNSPECIFIED TYPE: ICD-10-CM

## 2025-04-23 PROCEDURE — 95810 POLYSOM 6/> YRS 4/> PARAM: CPT

## 2025-04-29 ENCOUNTER — OFFICE VISIT (OUTPATIENT)
Dept: FAMILY MEDICINE | Facility: CLINIC | Age: 60
End: 2025-04-29
Payer: OTHER GOVERNMENT

## 2025-04-29 ENCOUNTER — RESULTS FOLLOW-UP (OUTPATIENT)
Dept: PULMONOLOGY | Facility: CLINIC | Age: 60
End: 2025-04-29
Payer: OTHER GOVERNMENT

## 2025-04-29 VITALS
OXYGEN SATURATION: 100 % | DIASTOLIC BLOOD PRESSURE: 76 MMHG | HEART RATE: 79 BPM | SYSTOLIC BLOOD PRESSURE: 102 MMHG | HEIGHT: 67 IN | WEIGHT: 214 LBS | BODY MASS INDEX: 33.59 KG/M2

## 2025-04-29 DIAGNOSIS — I10 PRIMARY HYPERTENSION: Primary | ICD-10-CM

## 2025-04-29 DIAGNOSIS — Z12.31 VISIT FOR SCREENING MAMMOGRAM: ICD-10-CM

## 2025-04-29 DIAGNOSIS — G47.33 OBSTRUCTIVE SLEEP APNEA SYNDROME: Primary | ICD-10-CM

## 2025-04-29 DIAGNOSIS — K57.90 DIVERTICULAR DISEASE: ICD-10-CM

## 2025-04-29 DIAGNOSIS — E55.9 VITAMIN D DEFICIENCY: ICD-10-CM

## 2025-04-29 DIAGNOSIS — E87.6 LOW POTASSIUM SYNDROME: ICD-10-CM

## 2025-04-29 DIAGNOSIS — N89.8 VAGINAL DISCHARGE: ICD-10-CM

## 2025-04-29 PROCEDURE — 99214 OFFICE O/P EST MOD 30 MIN: CPT | Mod: S$GLB,,, | Performed by: FAMILY MEDICINE

## 2025-04-29 RX ORDER — METRONIDAZOLE 500 MG/1
500 TABLET ORAL EVERY 12 HOURS
Qty: 14 TABLET | Refills: 0 | Status: SHIPPED | OUTPATIENT
Start: 2025-04-29 | End: 2025-05-06

## 2025-04-29 RX ORDER — ERGOCALCIFEROL 1.25 MG/1
50000 CAPSULE ORAL
Qty: 12 CAPSULE | Refills: 3 | Status: SHIPPED | OUTPATIENT
Start: 2025-04-29

## 2025-04-29 NOTE — PROGRESS NOTES
SUBJECTIVE:    Patient ID: Sloane Dimas is a 60 y.o. female.    Chief Complaint: 6M HTN Check Up and GI Concerns (-Concerns Of Discharge )    Hypertension presents for her regular visit.  Blood pressure is well-controlled on her current therapies    Mammogram is upcoming.  Has history of multiple benign lumpectomies.     Patient with episode of diverticulitis in March.  It was significant and she required long course of antibiotics.  Still has some issues with GI irritation.  However she has noted since that time a yellowish vaginal discharge that is persistent.  Does not report any significant odor or itching.  Patient has also noted vaginal flatulence.  Was informed by her gastroenterologist that her colon was noted to be very close to her bladder and vagina.  This is noted on CT scan.  Plans is to repeat her CT scan in about 2 months.            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  Tobacco History:  reports that she has never smoked. She has never used smokeless tobacco.  Drug History:  reports no history of drug use.    Review of patient's allergies indicates:   Allergen Reactions    Other      NARCOTICS MAKE ITCH, RESP DEPRESSION, EMOTIONAL    Hay fever and allergy relief Other (See  "Comments)     Current Medications[1]    Review of Systems   Constitutional:  Negative for activity change, fatigue and unexpected weight change.   HENT:  Negative for hearing loss, postnasal drip, sinus pressure, sore throat and voice change.    Eyes:  Negative for photophobia and visual disturbance.   Respiratory:  Negative for cough, shortness of breath and wheezing.    Cardiovascular:  Negative for chest pain and palpitations.   Gastrointestinal:  Negative for constipation, diarrhea and nausea.   Genitourinary:  Positive for frequency and vaginal discharge. Negative for difficulty urinating, dysuria, hematuria and urgency.   Musculoskeletal:  Negative for arthralgias and back pain.   Skin:  Negative for rash.   Neurological:  Negative for weakness, light-headedness and headaches.   Hematological:  Negative for adenopathy. Does not bruise/bleed easily.   Psychiatric/Behavioral:  The patient is not nervous/anxious.           Objective:          3/7/2025     5:04 PM 4/29/2025    10:47 AM   Vitals - 1 value per visit   SYSTOLIC 111 102   DIASTOLIC 79 76   Pulse 86 79   Temp 98.4 °F (36.9 °C)    Resp 16    SPO2 98 % 100 %   Weight (lb) 226 214   Weight (kg) 102.513 97.07   Height 5' 7" (1.702 m) 5' 7" (1.702 m)   BMI (Calculated) 35.4 33.5      Physical Exam  Constitutional:       Appearance: Normal appearance.   HENT:      Head: Normocephalic and atraumatic.      Mouth/Throat:      Mouth: Mucous membranes are moist.   Eyes:      Conjunctiva/sclera: Conjunctivae normal.   Pulmonary:      Effort: Pulmonary effort is normal.   Abdominal:      General: Abdomen is flat. Bowel sounds are normal. There is no distension.      Palpations: Abdomen is soft. There is no mass.      Tenderness: There is no abdominal tenderness. There is left CVA tenderness. There is no right CVA tenderness or guarding.   Neurological:      General: No focal deficit present.      Mental Status: She is alert and oriented to person, place, and time. "   Psychiatric:         Mood and Affect: Mood normal.         Behavior: Behavior normal.           Assessment:       1. Primary hypertension    2. Vitamin D deficiency    3. Vaginal discharge    4. Diverticular disease    5. Low potassium syndrome    6. Visit for screening mammogram         Plan:       Primary hypertension  Well controlled on current therapy     Vitamin D deficiency  -     ergocalciferol (ERGOCALCIFEROL) 50,000 unit Cap; Take 1 capsule (50,000 Units total) by mouth every 7 days.  Dispense: 12 capsule; Refill: 3  Continue current dosage    Vaginal discharge  -     metroNIDAZOLE (FLAGYL) 500 MG tablet; Take 1 tablet (500 mg total) by mouth every 12 (twelve) hours. for 7 days  Dispense: 14 tablet; Refill: 0  -     Urinalysis, Reflex to Urine Culture; Future; Expected date: 04/29/2025  -     Basic Metabolic Panel; Future; Expected date: 04/29/2025  Concerned for recto-vaginal fistula    Diverticular disease  -     Basic Metabolic Panel; Future; Expected date: 04/29/2025  Will have follow up colonoscopy    Low potassium syndrome  -     Basic Metabolic Panel; Future; Expected date: 04/29/2025  Repeat values due to low potassium    Visit for screening mammogram  -     Mammo Digital Screening Bilat w/ David (XPD); Future; Expected date: 07/22/2025      Medication List with Changes/Refills   New Medications    METRONIDAZOLE (FLAGYL) 500 MG TABLET    Take 1 tablet (500 mg total) by mouth every 12 (twelve) hours. for 7 days   Current Medications    AIRSUPRA 90-80 MCG/ACTUATION    Inhale 2 puffs into the lungs every 4 (four) hours as needed (cough). Coupon code: BIN 115229; PCN PDMI; GRP [id number]; ID [id number]    ONDANSETRON (ZOFRAN-ODT) 4 MG TBDL    Take 1 tablet (4 mg total) by mouth every 6 (six) hours as needed (nausea).    PREDNISONE (DELTASONE) 10 MG TABLET    Take one pill a day for three days, repeat for shortness of breath    VALSARTAN-HYDROCHLOROTHIAZIDE (DIOVAN-HCT) 160-12.5 MG PER TABLET    Take 1  tablet by mouth once daily.   Changed and/or Refilled Medications    Modified Medication Previous Medication    ERGOCALCIFEROL (ERGOCALCIFEROL) 50,000 UNIT CAP ergocalciferol (ERGOCALCIFEROL) 50,000 unit Cap       Take 1 capsule (50,000 Units total) by mouth every 7 days.    Take 1 capsule (50,000 Units total) by mouth every 7 days.   Discontinued Medications    TRAMADOL (ULTRAM) 50 MG TABLET    Take 1 tablet (50 mg total) by mouth every 6 (six) hours as needed for Pain.      Follow up in about 6 months (around 10/29/2025) for Annual Physical.      Stable on medications continue current           [1]   Current Outpatient Medications:     AIRSUPRA 90-80 mcg/actuation, Inhale 2 puffs into the lungs every 4 (four) hours as needed (cough). Coupon code: BIN 367700; PCN PDMI; [facility] [id number]; ID [id number], Disp: 10.7 g, Rfl: 11    ondansetron (ZOFRAN-ODT) 4 MG TbDL, Take 1 tablet (4 mg total) by mouth every 6 (six) hours as needed (nausea)., Disp: 15 tablet, Rfl: 0    predniSONE (DELTASONE) 10 MG tablet, Take one pill a day for three days, repeat for shortness of breath, Disp: 12 tablet, Rfl: 0    valsartan-hydrochlorothiazide (DIOVAN-HCT) 160-12.5 mg per tablet, Take 1 tablet by mouth once daily., Disp: 90 tablet, Rfl: 3    ergocalciferol (ERGOCALCIFEROL) 50,000 unit Cap, Take 1 capsule (50,000 Units total) by mouth every 7 days., Disp: 12 capsule, Rfl: 3    metroNIDAZOLE (FLAGYL) 500 MG tablet, Take 1 tablet (500 mg total) by mouth every 12 (twelve) hours. for 7 days, Disp: 14 tablet, Rfl: 0

## 2025-04-30 LAB
APPEARANCE UR: CLEAR
BACTERIA #/AREA URNS HPF: NORMAL /HPF
BACTERIA UR CULT: NORMAL
BILIRUB UR QL STRIP: NEGATIVE
BUN SERPL-MCNC: 12 MG/DL (ref 7–25)
BUN/CREAT SERPL: NORMAL (CALC) (ref 6–22)
CALCIUM SERPL-MCNC: 9.7 MG/DL (ref 8.6–10.4)
CHLORIDE SERPL-SCNC: 98 MMOL/L (ref 98–110)
CO2 SERPL-SCNC: 29 MMOL/L (ref 20–32)
COLOR UR: YELLOW
CREAT SERPL-MCNC: 0.98 MG/DL (ref 0.5–1.05)
EGFR: 66 ML/MIN/1.73M2
GLUCOSE SERPL-MCNC: 98 MG/DL (ref 65–99)
GLUCOSE UR QL STRIP: NEGATIVE
HGB UR QL STRIP: NEGATIVE
HYALINE CASTS #/AREA URNS LPF: NORMAL /LPF
KETONES UR QL STRIP: NEGATIVE
LEUKOCYTE ESTERASE UR QL STRIP: NEGATIVE
NITRITE UR QL STRIP: NEGATIVE
PH UR STRIP: 6 [PH] (ref 5–8)
POTASSIUM SERPL-SCNC: 4.1 MMOL/L (ref 3.5–5.3)
PROT UR QL STRIP: NEGATIVE
RBC #/AREA URNS HPF: NORMAL /HPF
SERVICE CMNT-IMP: NORMAL
SODIUM SERPL-SCNC: 138 MMOL/L (ref 135–146)
SP GR UR STRIP: 1.02 (ref 1–1.03)
SQUAMOUS #/AREA URNS HPF: NORMAL /HPF
WBC #/AREA URNS HPF: NORMAL /HPF

## 2025-05-09 ENCOUNTER — TELEPHONE (OUTPATIENT)
Dept: PULMONOLOGY | Facility: CLINIC | Age: 60
End: 2025-05-09
Payer: OTHER GOVERNMENT

## 2025-05-09 NOTE — TELEPHONE ENCOUNTER
Attempted to contact patient to review results, no answer, lvm for a return call.    ----- Message from Nuzhat Desai NP sent at 4/29/2025  6:03 PM CDT -----  Polysomnogram is positive for mild sleep apnea. Order for CPAP sent to local DME.   ----- Message -----  From: [facility], [facility]  Sent: 4/25/2025   9:59 AM CDT  To: Nuzhat Desai NP

## 2025-06-06 ENCOUNTER — PATIENT MESSAGE (OUTPATIENT)
Dept: FAMILY MEDICINE | Facility: CLINIC | Age: 60
End: 2025-06-06
Payer: OTHER GOVERNMENT

## 2025-06-06 DIAGNOSIS — Z12.11 COLON CANCER SCREENING: Primary | ICD-10-CM

## 2025-06-19 ENCOUNTER — TELEPHONE (OUTPATIENT)
Dept: FAMILY MEDICINE | Facility: CLINIC | Age: 60
End: 2025-06-19
Payer: OTHER GOVERNMENT

## 2025-06-19 ENCOUNTER — NURSE TRIAGE (OUTPATIENT)
Dept: ADMINISTRATIVE | Facility: CLINIC | Age: 60
End: 2025-06-19
Payer: OTHER GOVERNMENT

## 2025-06-19 NOTE — TELEPHONE ENCOUNTER
Copied from CRM #[id number]. Topic: General Inquiry - Patient Advice  >> Jun 19, 2025  3:58 PM Fritz Holt wrote:  Type:  Needs Medical Advice    Who Called: pt    Symptoms (please be specific): REFERRAL FOR GASTRO    Would the patient rather a call back or a response via [facility]? Call back    Best Call Back Number: [phone]    Additional Information: Pt needs the Referral written on 6/10 sent to her insurance  [employer]; also the practice for Dr. Kacie Pulliam needs to be updated to [facility] on the referral

## 2025-06-19 NOTE — TELEPHONE ENCOUNTER
Patient contacted the [facility] on Call [facility] because she missed a call from the office of her PCP, Dr. Samantha Justice. Patient states the message on her voice mailbox was from a Staff Member named Poornima.     Triage RN sent a Secure Chat message to Terri Knutson MA that provided her extension ([phone]) for patient transfer. Patient's call successfully transferred. Patient advised to contact the [facility] for any additional questions, symptoms, etc. Patient states understanding of care advice.     Reason for Disposition   General information question, no triage required and triager able to answer question    Additional Information   Negative: Caller is not with the adult (patient) and reporting urgent symptoms   Negative: Medicine question not related to refill or renewal   Negative: Requesting a renewal or refill of a medicine patient is currently taking   Negative: Questions or concerns about high blood pressure   Negative: Caller can't be reached by phone   Negative: Caller has already spoken to PCP (doctor or NP/PA) or another triager   Negative: Nursing judgment   Negative: Nursing judgment   Negative: Nursing judgment   Negative: Requesting lab results and adult stable (no new symptoms, not getting worse)   Negative: Requesting referral to a specialist   Negative: Questions about durable medical equipment ordered and triager unable to answer   Negative: Requesting regular office appointment and adult stable (no new symptoms, not getting worse)   Negative: Health information question, no triage required and triager able to answer question    Protocols used: Information Only Call - No Triage-A-[place]

## 2025-06-27 DIAGNOSIS — K57.32 DIVERTICULITIS OF LARGE INTESTINE: Primary | ICD-10-CM

## 2025-08-04 ENCOUNTER — HOSPITAL ENCOUNTER (OUTPATIENT)
Dept: RADIOLOGY | Facility: HOSPITAL | Age: 60
Discharge: HOME | End: 2025-08-04
Attending: FAMILY MEDICINE
Payer: OTHER GOVERNMENT

## 2025-08-04 VITALS — BODY MASS INDEX: 33.59 KG/M2 | HEIGHT: 67 IN | WEIGHT: 214 LBS

## 2025-08-04 DIAGNOSIS — Z12.31 VISIT FOR SCREENING MAMMOGRAM: ICD-10-CM

## 2025-08-04 PROCEDURE — 77063 BREAST TOMOSYNTHESIS BI: CPT | Mod: 26,,, | Performed by: RADIOLOGY

## 2025-08-04 PROCEDURE — 77067 SCR MAMMO BI INCL CAD: CPT | Mod: 26,,, | Performed by: RADIOLOGY

## 2025-08-04 PROCEDURE — 77067 SCR MAMMO BI INCL CAD: CPT | Mod: TC,PO

## 2025-08-06 ENCOUNTER — RESULTS FOLLOW-UP (OUTPATIENT)
Dept: FAMILY MEDICINE | Facility: CLINIC | Age: 60
End: 2025-08-06
Payer: OTHER GOVERNMENT

## 2025-08-15 ENCOUNTER — OFFICE VISIT (OUTPATIENT)
Dept: PULMONOLOGY | Facility: CLINIC | Age: 60
End: 2025-08-15
Payer: OTHER GOVERNMENT

## 2025-08-15 VITALS
WEIGHT: 214.94 LBS | OXYGEN SATURATION: 97 % | BODY MASS INDEX: 33.74 KG/M2 | DIASTOLIC BLOOD PRESSURE: 86 MMHG | HEART RATE: 80 BPM | HEIGHT: 67 IN | SYSTOLIC BLOOD PRESSURE: 123 MMHG

## 2025-08-15 DIAGNOSIS — G47.33 OBSTRUCTIVE SLEEP APNEA SYNDROME: Primary | ICD-10-CM

## 2025-08-15 PROCEDURE — 99213 OFFICE O/P EST LOW 20 MIN: CPT | Mod: PBBFAC,PO | Performed by: NURSE PRACTITIONER

## 2025-08-15 PROCEDURE — 99999 PR PBB SHADOW E&M-EST. PATIENT-LVL III: CPT | Mod: PBBFAC,,, | Performed by: NURSE PRACTITIONER
